# Patient Record
Sex: FEMALE | Race: BLACK OR AFRICAN AMERICAN | Employment: UNEMPLOYED | URBAN - METROPOLITAN AREA
[De-identification: names, ages, dates, MRNs, and addresses within clinical notes are randomized per-mention and may not be internally consistent; named-entity substitution may affect disease eponyms.]

---

## 2017-09-10 ENCOUNTER — HOSPITAL ENCOUNTER (EMERGENCY)
Age: 27
Discharge: HOME | End: 2017-09-10
Attending: EMERGENCY MEDICINE
Payer: MEDICAID

## 2017-09-10 VITALS
DIASTOLIC BLOOD PRESSURE: 73 MMHG | TEMPERATURE: 98.4 F | HEIGHT: 63 IN | WEIGHT: 217.59 LBS | OXYGEN SATURATION: 100 % | RESPIRATION RATE: 16 BRPM | BODY MASS INDEX: 38.55 KG/M2 | SYSTOLIC BLOOD PRESSURE: 128 MMHG | HEART RATE: 71 BPM

## 2017-09-10 PROCEDURE — 96374 THER/PROPH/DIAG INJ IV PUSH: CPT

## 2017-09-10 PROCEDURE — 96375 TX/PRO/DX INJ NEW DRUG ADDON: CPT

## 2017-09-10 PROCEDURE — 96361 HYDRATE IV INFUSION ADD-ON: CPT

## 2017-09-10 PROCEDURE — 74011250637 HC RX REV CODE- 250/637: Performed by: EMERGENCY MEDICINE

## 2017-09-10 PROCEDURE — 74011250636 HC RX REV CODE- 250/636: Performed by: EMERGENCY MEDICINE

## 2017-09-10 PROCEDURE — 99283 EMERGENCY DEPT VISIT LOW MDM: CPT

## 2017-09-10 RX ORDER — ACETAMINOPHEN 325 MG/1
650 TABLET ORAL
Status: COMPLETED | OUTPATIENT
Start: 2017-09-10 | End: 2017-09-10

## 2017-09-10 RX ORDER — ONDANSETRON 2 MG/ML
4 INJECTION INTRAMUSCULAR; INTRAVENOUS
Status: COMPLETED | OUTPATIENT
Start: 2017-09-10 | End: 2017-09-10

## 2017-09-10 RX ORDER — METOCLOPRAMIDE HYDROCHLORIDE 5 MG/ML
10 INJECTION INTRAMUSCULAR; INTRAVENOUS
Status: COMPLETED | OUTPATIENT
Start: 2017-09-10 | End: 2017-09-10

## 2017-09-10 RX ORDER — BUTALBITAL, ACETAMINOPHEN AND CAFFEINE 50; 325; 40 MG/1; MG/1; MG/1
2 TABLET ORAL
Status: COMPLETED | OUTPATIENT
Start: 2017-09-10 | End: 2017-09-10

## 2017-09-10 RX ORDER — BUTALBITAL, ACETAMINOPHEN AND CAFFEINE 300; 40; 50 MG/1; MG/1; MG/1
1 CAPSULE ORAL
Qty: 15 CAP | Refills: 0 | Status: SHIPPED | OUTPATIENT
Start: 2017-09-10 | End: 2017-10-25 | Stop reason: DRUGHIGH

## 2017-09-10 RX ORDER — OXYCODONE AND ACETAMINOPHEN 5; 325 MG/1; MG/1
1 TABLET ORAL
COMMUNITY
End: 2017-10-25 | Stop reason: ALTCHOICE

## 2017-09-10 RX ADMIN — SODIUM CHLORIDE 1000 ML: 900 INJECTION, SOLUTION INTRAVENOUS at 15:27

## 2017-09-10 RX ADMIN — BUTALBITAL, ACETAMINOPHEN AND CAFFEINE 2 TABLET: 50; 325; 40 TABLET ORAL at 16:13

## 2017-09-10 RX ADMIN — ONDANSETRON 4 MG: 2 INJECTION INTRAMUSCULAR; INTRAVENOUS at 15:23

## 2017-09-10 RX ADMIN — METOCLOPRAMIDE 10 MG: 5 INJECTION, SOLUTION INTRAMUSCULAR; INTRAVENOUS at 15:23

## 2017-09-10 RX ADMIN — ACETAMINOPHEN 650 MG: 325 TABLET ORAL at 15:23

## 2017-09-10 NOTE — ED NOTES
Pt presents to ED generalized headache, neck pain, nausea x 4th. Pt reports she had a baby vaginal 9/4/17, pt reports she had an epidural. Pt alert and oriented x 4. Pt reports tying percocet, ibuprofen x without relief.

## 2017-09-10 NOTE — ED NOTES
I have reviewed discharge instructions with the patient. The patient verbalized understanding. Pt discharged with written instructions. No further concerns at this time. IV removed.

## 2017-09-10 NOTE — PROGRESS NOTES
Called to see pt for epidural blood patch s/p multiple [place] (without success) [place] [facility] [place]. Pt describes post dural puncture type head ache after about three attempts from multiple providers at [facility] for labor 6 days ago. I explained this risk of performing an epidural blood patch and that I would have to attempt an epidural to perform an \"epidural blood patch\". She didn't know that I would have to do an epidural to perform an \"epidural blood patch\". I stated it might be difficult because anesthesia at [facility] [place] perform the epidural successfully, but that I would be happy to try and prior failures did not guarantee that I would be unsuccessful. I also told her most pt's experience some back pain/pressure after a blood patch and she said she already had back pain and she did not want to do anything that might make her back pain worse or possibly worsen her head ache as any epidural attempt carries that risk. She said she will just try to wait it out until her head ache subsides as her body should make more CSF. I told her she is welcome to return at any time if she changes her mind and certainly if her HA worsens or other symptom appear. I made it clear that I could attempt the epidural blood patch right now, but she declined.

## 2017-09-10 NOTE — ED PROVIDER NOTES
HPI Comments: Casis Muir is a 23 y.o. female with hx of vaginal birth 9/4/17 who presents ambulatory to the ED c/o constant severe diffuse HA, neck pain, photophobia, and nausea s/p delivery. She also notes having SOB and moderate abdominal pain. Pt reports three attempts of epidural injections were made without success. Pt was discharged from the hospital on 9/8/17. She denies any complications with the delivery. She denies taking any blood thinners. Pt notes taking BCs, percocet and ibuprofen without relief. Pt specifically denies fever, chills, sore throat, rhinorrhea, cough, CP, V/D, dysuria, lightheadedness and rash. Social hx: - Tobacco use, - EtOH use, - Illicit drug use    PCP: None    There are no other complaints, changes or physical findings at this time. The history is provided by the patient. No  was used. Past Medical History:   Diagnosis Date    Ill-defined condition     Vaginal Birth 9/4/17       History reviewed. No pertinent surgical history. History reviewed. No pertinent family history. Social History     Social History    Marital status: SINGLE     Spouse name: N/A    Number of children: N/A    Years of education: N/A     Occupational History    Not on file. Social History Main Topics    Smoking status: Never Smoker    Smokeless tobacco: Never Used    Alcohol use No    Drug use: No    Sexual activity: Not on file     Other Topics Concern    Not on file     Social History Narrative    No narrative on file         ALLERGIES: Review of patient's allergies indicates no known allergies. Review of Systems   Constitutional: Negative for chills, diaphoresis, fever and unexpected weight change. HENT: Negative for rhinorrhea and sore throat. Eyes: Positive for photophobia. Negative for pain. Respiratory: Positive for shortness of breath. Negative for wheezing and stridor. Cardiovascular: Negative for chest pain and leg swelling. Gastrointestinal: Positive for abdominal pain and nausea. Negative for blood in stool, diarrhea and vomiting. Genitourinary: Negative for difficulty urinating, dysuria and flank pain. Musculoskeletal: Positive for neck pain. Negative for back pain and neck stiffness. Skin: Negative for rash. Neurological: Positive for headaches. Negative for seizures, syncope, weakness and light-headedness. Psychiatric/Behavioral: Negative for confusion. Patient Vitals for the past 12 hrs:   Temp Pulse Resp BP SpO2   09/10/17 1730 - - - 128/73 100 %   09/10/17 1323 98.4 °F (36.9 °C) 71 16 135/78 100 %       Physical Exam   Constitutional: She is oriented to person, place, and time. She appears well-developed and well-nourished. No distress. Uncomfortable appearing. HENT:   Nose: Nose normal.   Mouth/Throat: Oropharynx is clear and moist. No oropharyngeal exudate. Eyes: Conjunctivae and EOM are normal. Pupils are equal, round, and reactive to light. Right eye exhibits no discharge. Left eye exhibits no discharge. No scleral icterus. Wearing sunglasses. Neck: Normal range of motion. Neck supple. No JVD present. Cardiovascular: Normal rate, regular rhythm, normal heart sounds and intact distal pulses. No murmur heard. Pulmonary/Chest: Effort normal and breath sounds normal. No stridor. No respiratory distress. She has no wheezes. She has no rales. Abdominal: Soft. Bowel sounds are normal. She exhibits no distension. There is no rebound and no guarding. Mild diffuse tenderness to abdomen (appropriate)   Musculoskeletal: She exhibits no edema or tenderness. Neurological: She is alert and oriented to person, place, and time. Skin: Skin is warm and dry. No rash noted. She is not diaphoretic. Psychiatric: She has a normal mood and affect. Nursing note and vitals reviewed.        MDM  Number of Diagnoses or Management Options  Epidural anesthesia-induced headache during puerperium:   Diagnosis management comments: Post epidural HA x six days. Pt neurologically intact. Awaiting blood patch at time of sign out. Amount and/or Complexity of Data Reviewed  Review and summarize past medical records: yes    Patient Progress  Patient progress: stable    ED Course       Procedures    CONSULT NOTE:   2:40 PM  Radha Keita MD spoke with Dr. Selma Jurado,  Specialty: anesthesiology  Discussed pt's hx, disposition, and available diagnostic and imaging results. Reviewed care plans. Consultant agrees with plans as outlined. Dr. Selma Jurado will perform a blood patch. Written by Blake May ED Scribe, as dictated by Radha Keita MD.      SIGN OUT:  4:54 PM  Patient's presentation, labs/imaging and plan of care was reviewed with Dr. Jung Valladares as part of sign out. They will dispo after blood patch as part of the plan discussed with the patient. Dr. Nathan Ram assistance in completion of this plan is greatly appreciated but it should be noted that I will be the provider of record for this patient. Radha Keita MD  Progress Note:  6:38 PM  Discussed workup results/findings, and counseled pt regarding her diagnosis. Pt has been encouraged to follow-up as instructed. All questions have been answered, and pt conveyed understanding. Written by NIKKIE Dyer, as dictated by Asha Blackmon DO.    MEDICATIONS GIVEN:  Medications   sodium chloride 0.9 % bolus infusion 1,000 mL (0 mL IntraVENous IV Completed 9/10/17 1825)   acetaminophen (TYLENOL) tablet 650 mg (650 mg Oral Given 9/10/17 1523)   ondansetron (ZOFRAN) injection 4 mg (4 mg IntraVENous Given 9/10/17 1523)   metoclopramide HCl (REGLAN) injection 10 mg (10 mg IntraVENous Given 9/10/17 1523)   butalbital-acetaminophen-caffeine (FIORICET, ESGIC) -40 mg per tablet 2 Tab (2 Tabs Oral Given 9/10/17 1613)       IMPRESSION:  1. Epidural anesthesia-induced headache during puerperium        PLAN:  1. Discharge home.   Current Discharge Medication List      START taking these medications    Details   butalbital-acetaminophen-caff (FIORICET) -40 mg per capsule Take 1 Cap by mouth every four (4) hours as needed for Pain. Max Daily Amount: 6 Caps. Qty: 15 Cap, Refills: 0           2. Follow-up Information     Follow up With Details Comments Contact Info    [facility] Department Of Obstetrics & Gynecology Call in 2 days  [address]  [phone]        3. Return to ED if worse       DISCHARGE NOTE:  6:38 PM  Patient's results have been reviewed with them. Patient and/or family have verbally conveyed their understanding and agreement of the patient's signs, symptoms, diagnosis, treatment and prognosis and additionally agree to follow up as recommended or return to the Emergency Room should their condition change prior to follow-up. Discharge instructions have also been provided to the patient with some educational information regarding their diagnosis as well a list of reasons why they would want to return to the ER prior to their follow-up appointment should their condition change. This note is prepared by Cristela Goyal, acting as Scribe for DO Lizbeth Mcneil DO: The scribe's documentation has been prepared under my direction and personally reviewed by me in its entirety. I confirm that the note above accurately reflects all work, treatment, procedures, and medical decision making performed by me.

## 2017-10-25 ENCOUNTER — OFFICE VISIT (OUTPATIENT)
Dept: INTERNAL MEDICINE CLINIC | Age: 27
End: 2017-10-25

## 2017-10-25 VITALS
RESPIRATION RATE: 18 BRPM | WEIGHT: 203 LBS | TEMPERATURE: 98.6 F | HEIGHT: 63 IN | DIASTOLIC BLOOD PRESSURE: 76 MMHG | OXYGEN SATURATION: 98 % | BODY MASS INDEX: 35.97 KG/M2 | HEART RATE: 87 BPM | SYSTOLIC BLOOD PRESSURE: 115 MMHG

## 2017-10-25 DIAGNOSIS — R29.898 WEAKNESS OF BOTH LEGS: ICD-10-CM

## 2017-10-25 DIAGNOSIS — Z76.89 ENCOUNTER TO ESTABLISH CARE: ICD-10-CM

## 2017-10-25 DIAGNOSIS — Z13.31 DEPRESSION SCREENING: ICD-10-CM

## 2017-10-25 DIAGNOSIS — M54.50 ACUTE MIDLINE LOW BACK PAIN WITHOUT SCIATICA: ICD-10-CM

## 2017-10-25 DIAGNOSIS — Z97.5 NORPLANT IN PLACE: ICD-10-CM

## 2017-10-25 DIAGNOSIS — R51.9 FREQUENT HEADACHES: Primary | ICD-10-CM

## 2017-10-25 RX ORDER — DOCUSATE SODIUM 100 MG
CAPSULE ORAL
Refills: 0 | COMMUNITY
Start: 2017-09-06 | End: 2017-10-25

## 2017-10-25 RX ORDER — LANOLIN ALCOHOL/MO/W.PET/CERES
CREAM (GRAM) TOPICAL
Refills: 0 | COMMUNITY
Start: 2017-09-06 | End: 2017-10-25

## 2017-10-25 RX ORDER — SENNOSIDES 8.6 MG
TABLET ORAL
Refills: 0 | COMMUNITY
Start: 2017-09-06 | End: 2017-10-25

## 2017-10-25 RX ORDER — IBUPROFEN 600 MG/1
TABLET ORAL
Refills: 0 | COMMUNITY
Start: 2017-09-06 | End: 2017-10-25 | Stop reason: ALTCHOICE

## 2017-10-25 RX ORDER — BUTALBITAL, ACETAMINOPHEN AND CAFFEINE 50; 325; 40 MG/1; MG/1; MG/1
TABLET ORAL
Refills: 0 | COMMUNITY
Start: 2017-09-29 | End: 2017-10-25 | Stop reason: DRUGHIGH

## 2017-10-25 RX ORDER — BUTALBITAL, ACETAMINOPHEN AND CAFFEINE 300; 40; 50 MG/1; MG/1; MG/1
CAPSULE ORAL
Qty: 12 CAP | Refills: 0 | Status: SHIPPED | OUTPATIENT
Start: 2017-10-25 | End: 2017-12-06 | Stop reason: SDUPTHER

## 2017-10-25 RX ORDER — AMITRIPTYLINE HYDROCHLORIDE 25 MG/1
TABLET, FILM COATED ORAL
Qty: 30 TAB | Refills: 11 | Status: SHIPPED | OUTPATIENT
Start: 2017-10-25 | End: 2018-08-14

## 2017-10-25 RX ORDER — BUTALBITAL, ACETAMINOPHEN AND CAFFEINE 300; 40; 50 MG/1; MG/1; MG/1
1 CAPSULE ORAL
Qty: 15 CAP | Refills: 0 | Status: CANCELLED | OUTPATIENT
Start: 2017-10-25

## 2017-10-25 RX ORDER — NAPROXEN 500 MG/1
500 TABLET ORAL
Qty: 20 TAB | Refills: 3 | Status: SHIPPED | OUTPATIENT
Start: 2017-10-25 | End: 2017-12-06 | Stop reason: SDUPTHER

## 2017-10-25 NOTE — PROGRESS NOTES
Pt is here for   Chief Complaint   Patient presents with    New Patient     pt is here to establish care   [address]     pt states she's still bleeding, had baby on 9/4/17, pt states that she does have the implanon    Headache     pt states she's been having really bad headaches     1. Have you been to the ER, urgent care clinic since your last visit? Hospitalized since your last visit? No    2. Have you seen or consulted any other health care providers outside of the [address] [place] [address] since your last visit? Include any pap smears or colon screening.  No     Pt denies pain at this time

## 2017-10-25 NOTE — PROGRESS NOTES
Esther Ireland is a 23 y.o. female and presents with New Patient (pt is here to establish care); Post-Partum Care (pt states she's still bleeding, had baby on 17, pt states that she does have the implanon); and Headache (pt states she's been having really bad headaches)    Subjective:  Pt here to establish care. A0, delivered 17 via  to baby girl. Sole provider at this time. Here for postpartum visit for insurance, however still with vaginal bleeding reported. Using several pads daily for blood. Associated with lower back pain and headaches following epidural attempt during delivery. Denies suicidal or homicidal ideation. No fever reported. Has HA several times daily, onset since delivery. No h/o lower back pain or headache during pregnancy or before, only since delivery and epidural attempt. Delivered at [facility] and followed by their OB clinic initially, but prefers a different provider so came here for care instead. Not lactating. Also reports gaining 75 lbs during pregnancy, down 30 lbs now. Usually very active.      Review of Systems  Constitutional: negative for fevers, chills, anorexia and weight loss  Eyes:   negative for visual disturbance, drainage, and irritation  ENT:   negative for tinnitus,sore throat,nasal congestion,ear pain,and hoarseness  Respiratory:  negative for cough, hemoptysis, dyspnea, and wheezing  CV:   negative for chest pain, palpitations, and lower extremity edema  GI:   negative for nausea, vomiting, diarrhea, abdominal pain, and melena  Endo:               negative for polyuria,polydipsia,polyphagia, and heat intolerance  Genitourinary: negative for frequency, urgency, dysuria, retention, and hematuria  Integument:  negative for rash, ulcerations, and pruritus  Hematologic:  negative for easy bruising and bleeding  Musculoskel: negative for arthralgias, muscle weakness,and joint pain/swelling  Neurological:  negative for headaches, dizziness, vertigo,and memory/gait problems  Behavl/Psych: negative for feelings of anxiety, depression, suicide, and mood changes    Past Medical History:   Diagnosis Date    Headache     Ill-defined condition     Vaginal Birth 9/4/17     History reviewed. No pertinent surgical history. Social History     Social History    Marital status: SINGLE     Spouse name: N/A    Number of children: N/A    Years of education: N/A     Social History Main Topics    Smoking status: Never Smoker    Smokeless tobacco: Never Used    Alcohol use No    Drug use: No    Sexual activity: Not Asked     Other Topics Concern    None     Social History Narrative     History reviewed. No pertinent family history. Current Outpatient Prescriptions   Medication Sig Dispense Refill    butalbital-acetaminophen-caff (FIORICET) -40 mg per capsule Take 1 tab every 3 days as needed for severe headache/migraine  Indications: HEADACHE 12 Cap 0    naproxen (NAPROSYN) 500 mg tablet Take 1 Tab by mouth two (2) times daily as needed for Pain for up to 10 days. With food 20 Tab 3    amitriptyline (ELAVIL) 25 mg tablet Start with 1/2 tab for a few days, if NOT too sedating, increase to 1 tab nightly. Indications: HA suppression, insomnia 30 Tab 11     No Known Allergies    Objective:  Visit Vitals    /76 (BP 1 Location: Left arm, BP Patient Position: Sitting)    Pulse 87    Temp 98.6 °F (37 °C) (Oral)    Resp 18    Ht 5' 3\" (1.6 m)    Wt 203 lb (92.1 kg)    SpO2 98%    BMI 35.96 kg/m2     Wt Readings from Last 3 Encounters:   10/25/17 203 lb (92.1 kg)   09/10/17 217 lb 9.5 oz (98.7 kg)     Physical Exam:   General appearance - alert, well appearing, and in no distress. Mental status - A/O x 4, sad mood and flat affect. Neck -Supple ,normal CSP. FROM, non-tender. No significant adenopathy/thyromegaly. No JVD. Chest - CTA. Symmetric chest rise. No wheezing, rales or rhonchi. Heart - Normal rate, regular rhythm. Normal S1, S2.  No MGR or clicks. Abdomen - Soft,non-distended. Normoactive BS in all quadrants. NT, no mass or HSM. Palpable fundus above pelvis. Ext- Radial, DP pulses, 2+ bilaterally. No pedal edema, clubbing, or cyanosis. Skin-Warm and dry. No hyperpigmentation, ulcerations, or suspicious lesions. Neuro - Normal speech, no focal findings or movement disorder. Normal strength, gait, and muscle tone. Back- alignment midline. No spinal or paraspinal tenderness. No CVAT. Assessment/Plan:  Labs ordered. Referred to NEURO and OBGYN. Amitriptyline 1/2-1 tab for frequent HA, naproxen, and fioricet prescribed. Pt planning to lose wt now via exercise and modified diet. MRI of LSP to r/o epidural abscess. I spent greater than 50% of 45 minute visit counseling patient about diagnostic results, impressions, prognosis, risk/benefits of treatment options, medication management and adequate follow-up, importance of adherence to treatment plan, and risk factor reduction. Discussed the patient's BMI with her. The BMI follow up plan is as follows:     I have reviewed/discussed the above normal BMI with the patient. I have recommended the following interventions: dietary management education, guidance, and counseling, encourage exercise and monitor weight . Medication Side Effects and Warnings were discussed with patient: yes   Patient Labs were reviewed: yes  Patient Past Records were reviewed: yes    See below for other orders   Follow-up Disposition:  Return in about 6 weeks (around 12/6/2017) for 6 wk HA, LBP f/u. Pt has given consent verbally while in office for [employer] messaging. ICD-10-CM ICD-9-CM    1. Frequent headaches R51 784.0 REFERRAL TO NEUROLOGY      butalbital-acetaminophen-caff (FIORICET) -40 mg per capsule      naproxen (NAPROSYN) 500 mg tablet      amitriptyline (ELAVIL) 25 mg tablet   2.  Acute midline low back pain without sciatica M54.5 724.2 SED RATE (ESR)      CBC WITH AUTOMATED DIFF      METABOLIC PANEL, COMPREHENSIVE      MRI LUMB SPINE W WO CONT   3. Weakness of both legs R29.898 729.89 REFERRAL TO NEUROLOGY      MRI LUMB SPINE W WO CONT      HEMOGLOBIN A1C WITH EAG      TSH 3RD GENERATION   4. Postpartum hemorrhage, unspecified type O72.1 666.10 REFERRAL TO GYNECOLOGY   5. Normal spontaneous vaginal delivery O80 650 REFERRAL TO GYNECOLOGY   6. Norplant in place Z97.5 V45.52    7. Encounter to establish care Z76.89 V65.8    8. Depression screening Z13.89 V79.0      Orders Placed This Encounter    MRI LUMB SPINE W WO CONT     Standing Status:   Future     Standing Expiration Date:   11/25/2018     Scheduling Instructions:      6 wk postpartum     Order Specific Question:   Is Patient Allergic to Contrast Dye? Answer:   No     Order Specific Question:   Is Patient Pregnant? Answer:   No     Order Specific Question:   STAT Creatinine as indicated     Answer: Yes    SED RATE (ESR)    CBC WITH AUTOMATED DIFF    METABOLIC PANEL, COMPREHENSIVE    HEMOGLOBIN A1C WITH EAG    TSH 3RD GENERATION    REFERRAL TO GYNECOLOGY     Referral Priority:   Routine     Referral Type:   Consultation     Referral Reason:   Specialty Services Required     Referred to Provider:   Rianna Owens NP    REFERRAL TO NEUROLOGY     Referral Priority:   Routine     Referral Type:   Consultation     Referral Reason:   Specialty Services Required     Referred to Provider:   Salud Bazan MD    DISCONTD: butalbital-acetaminophen-caffeine (FIORICET, ESGIC) -40 mg per tablet     Sig: TK 1 T PO  Q 4 H PRN FOR PAIN. MDD  6 TABLETS.      Refill:  0    DISCONTD: STOOL SOFTENER 100 mg capsule     Sig: TAKE ONE CAPSULE BY MOUTH TWICE A DAY AS NEEDED FOR CONSTIPATION     Refill:  0    DISCONTD: ferrous sulfate 325 mg (65 mg iron) tablet     Sig: TAKE ONE TABLET BY MOUTH TWICE A DAY     Refill:  0    DISCONTD: ibuprofen (MOTRIN) 600 mg tablet     Sig: TAKE ONE TABLET BY MOUTH THREE TIMES A DAY AS NEEDED FOR PAIN Refill:  0    DISCONTD: SENNA 8.6 mg tablet     Sig: TAKE ONE TABLET BY MOUTH AT BEDTIME AS NEEDED FOR CONSTIPATION     Refill:  0    butalbital-acetaminophen-caff (FIORICET) -40 mg per capsule     Sig: Take 1 tab every 3 days as needed for severe headache/migraine  Indications: HEADACHE     Dispense:  12 Cap     Refill:  0    naproxen (NAPROSYN) 500 mg tablet     Sig: Take 1 Tab by mouth two (2) times daily as needed for Pain for up to 10 days. With food     Dispense:  20 Tab     Refill:  3    amitriptyline (ELAVIL) 25 mg tablet     Sig: Start with 1/2 tab for a few days, if NOT too sedating, increase to 1 tab nightly. Indications: HA suppression, insomnia     Dispense:  30 Tab     Refill:  11       Amanda Figueroa expressed understanding of plan. An After Visit Summary was offered/printed and given to the patient.

## 2017-10-25 NOTE — PATIENT INSTRUCTIONS
Try taking PROBIOTICS 10 billion units daily for GI health. For vaginal health, 2-4 billion daily. Epidural for Childbirth: What to Expect at [facility]  An epidural is a tiny tube that delivers pain medicine directly into the area in your back around your spinal cord. The effects of the epidural usually wear off within 2 hours after the epidural medicine is stopped. After the epidural wears off, you may have some hip or back pain from childbirth. You may have a small bruise and the skin may be sore where the epidural was put in your back. This will probably get better in 1 or 2 days. In rare cases, an epidural can cause a headache, especially when you sit or stand. This can last for several days. If you get a headache after the epidural, call your doctor. He or she can treat it. This care sheet gives you a general idea about how long it will take for you to recover. But each person recovers at a different pace. Follow the steps below to get better as quickly as possible. How can you care for yourself at home? · By the time you go home, the effects of the epidural will be gone. If you have any problems, call your doctor. · Follow any instructions your doctor gave you about how to care for yourself after your delivery. Call your doctor if you have questions about post-delivery care. Follow-up care is a key part of your treatment and safety. Be sure to make and go to all appointments, and call your doctor if you are having problems. It's also a good idea to know your test results and keep a list of the medicines you take. When should you call for help? Call your doctor now or seek immediate medical care if:  · You have a fever. · You have a new or worse headache. · You have a stiff neck. · You have tingling, weakness, or numbness in your legs. · You have trouble urinating or can pass only very small amounts of urine.   Watch closely for changes in your health, and be sure to contact your doctor if:  · You do not get better as expected. Where can you learn more? Go to http://melvina-jase.info/. Enter [id number] [id number] in the search box to learn more about \"Epidural for Childbirth: What to Expect at Home. \"  Current as of: March 16, 2017  Content Version: 11.3  © 6581-9290 [employer]. Care instructions adapted under license by [employer] (which disclaims liability or warranty for this information). If you have questions about a medical condition or this instruction, always ask your healthcare professional. [address] any warranty or liability for your use of this information. MRI of the Lumbar Spine: About This Test  What is it? MRI (magnetic resonance imaging) is a test that uses a magnetic field and pulses of radio wave energy to make pictures of the organs and structures inside the body. An MRI can give your doctor information about the spine in your lower back (the lumbar spine). This can include the spine, the space around the spinal cord, and the vertebrae in your lower back. When you have an MRI, you lie on a table that moves into the MRI machine. Why is this test done? An MRI of the lumbar spine can help find the cause of symptoms like back pain or leg pain, weakness, or numbness. It can help find problems such as a herniated disc, a tumor, or an infection. How can you prepare for the test?  Talk to your doctor about all your health conditions before the test. For example, tell your doctor if:  · You are allergic to any medicines. · You are or might be pregnant. · You have a pacemaker, an artificial limb, any metal pins or metal parts in your body, metal heart valves, metal clips in your brain, metal implants in your ears, or any other implanted or prosthetic medical device. · You have an intrauterine device (IUD) in place. · You get nervous in confined spaces. You may need medicine to help you relax.   · You wear a patch that contains medicine. · You have kidney disease. What happens before the test?  · You will remove all metal objects, such as hearing aids, dentures, jewelry, watches, and hairpins. · You will take off all or most of your clothes and change into a gown. · You may have contrast material (dye) put into your arm through a tube called an IV. Contrast material helps doctors see specific organs, blood vessels, and most tumors. What happens during the test?  · You will lie on your back on a table that is part of the MRI scanner. Your head, chest, and arms may be held with straps to help you stay still. · The table will slide into the space that contains the magnet. · Inside the scanner you will hear a fan and feel air moving. You may hear tapping, thumping, or snapping noises. You may be given earplugs or headphones to reduce the noise. · You will be asked to hold still during the scan. You may be asked to hold your breath for short periods. · You may be alone in the scanning room, but a technologist will watch you through a window and talk with you during the test.  What else should you know about the test?  · An MRI does not hurt. · If a dye is used, you may feel a quick sting or pinch and some coolness when the IV is started. Some people feel sick to their stomach or get a headache. · If you breastfeed and are concerned about whether the dye used in this test is safe, talk to your doctor. Most experts believe that very little dye passes into breast milk and even less is passed on to the baby. But if you prefer, you can store some of your breast milk ahead of time and use it for a day or two after the test.  · You may feel warmth in the area being examined. This is normal.  How long does the test take? · The test usually takes 30 to 60 minutes.   What happens after the test?  · You will probably be able to go home right away, depending on the reason for the test.  · You can go back to your usual activities right away. Follow-up care is a key part of your treatment and safety. Be sure to make and go to all appointments, and call your doctor if you are having problems. It's also a good idea to keep a list of the medicines you take. Ask your doctor when you can expect to have your test results. Where can you learn more? Go to http://melvina-jase.info/. Enter Z348 in the search box to learn more about \"MRI of the Lumbar Spine: About This Test.\"  Current as of: October 14, 2016  Content Version: 11.3  © 9527-0722 [employer]. Care instructions adapted under license by [employer] (which disclaims liability or warranty for this information). If you have questions about a medical condition or this instruction, always ask your healthcare professional. [address] any warranty or liability for your use of this information. Butalbital/Acetaminophen/Caffeine (By mouth)   Acetaminophen (k-gzym-n-MIN-oh-fen), Butalbital (nsw-WHF-uu-noman), Caffeine (KAF-een)  Treats headaches. Brand Name(s): Capacet, Esgic, Fioricet, Vanatol LQ, Vanatol S, Zebutal   There may be other brand names for this medicine. When This Medicine Should Not Be Used: This medicine is not right for everyone. Do not use it if you had an allergic reaction to acetaminophen, butalbital, or caffeine, or if you have porphyria. How to Use This Medicine:   Capsule, Liquid, Tablet  · Take your medicine as directed. Do not use more medicine or take it more often than your doctor tells you to. · Measure the oral liquid medicine with a marked measuring spoon, oral syringe, or medicine cup. · This medicine is not for long-term use. · Store the medicine in a closed container at room temperature, away from heat, moisture, and direct light.   Drugs and Foods to Avoid:   Ask your doctor or pharmacist before using any other medicine, including over-the-counter medicines, vitamins, and herbal products. · Some medicines can affect how this medicine works. Tell your doctor if you are also using an MAO inhibitor (MAOI). · Tell your doctor if you use anything else that makes you sleepy. Some examples are allergy medicine, narcotic pain medicine, and alcohol. · Do not drink alcohol while you are using this medicine. Acetaminophen can damage your liver, and alcohol can increase this risk. Warnings While Using This Medicine:   · Tell your doctor if you are pregnant or breastfeeding, or if you have kidney disease, liver disease, or stomach problems. Tell your doctor if you have a history of alcohol or drug addiction. · This medicine may cause the following problems:  ¨ Liver damage  ¨ Serious skin reactions  · This medicine contains acetaminophen. Read the labels of all other medicines you are using to see if they also contain acetaminophen, or ask your doctor or pharmacist. April Simmons not use more than 4 grams (4,000 milligrams) total of acetaminophen in one day. · This medicine may make you dizzy or drowsy. Do not drive or do anything that could be dangerous until you know how this medicine affects you. · This medicine can be habit-forming. Do not use more than your prescribed dose. Call your doctor if you think your medicine is not working. · Tell any doctor or dentist who treats you that you are using this medicine. This medicine may affect certain medical test results. · Keep all medicine out of the reach of children. Never share your medicine with anyone.   Possible Side Effects While Using This Medicine:   Call your doctor right away if you notice any of these side effects:  · Allergic reaction: Itching or hives, swelling in your face or hands, swelling or tingling in your mouth or throat, chest tightness, trouble breathing  · Blistering, peeling, red skin rash  · Dark urine or pale stools, nausea, vomiting, loss of appetite, stomach pain, yellow skin or eyes  · Extreme dizziness or weakness, trouble breathing, slow heartbeat, seizures, and cold, clammy skin  · Lightheadedness, dizziness, fainting  If you notice these less serious side effects, talk with your doctor:   · Mild nausea or vomiting  · Sleepiness, tiredness  If you notice other side effects that you think are caused by this medicine, tell your doctor. Call your doctor for medical advice about side effects. You may report side effects to FDA at 9-330-FDA-3112  © 2017 [address] Information is for End User's use only and may not be sold, redistributed or otherwise used for commercial purposes. The above information is an  only. It is not intended as medical advice for individual conditions or treatments. Talk to your doctor, nurse or pharmacist before following any medical regimen to see if it is safe and effective for you. Amitriptyline (By mouth)   Amitriptyline (am-i-TRIP-ti-mark)  Treats depression. This medicine is a TCA. Brand Name(s): Elavil   There may be other brand names for this medicine. When This Medicine Should Not Be Used: This medicine is not right for everyone. Do not use if you had an allergic reaction to amitriptyline. How to Use This Medicine:   Tablet  · Take your medicine as directed. Your dose may need to be changed several times to find what works best for you. · This medicine should come with a Medication Guide. Ask your pharmacist for a copy if you do not have one. · Store the medicine in a closed container at room temperature, away from heat, moisture, and direct light. · Missed dose: Take a dose as soon as you remember. If it is almost time for your next dose, wait until then and take a regular dose. Do not take extra medicine to make up for a missed dose. Drugs and Foods to Avoid:   Ask your doctor or pharmacist before using any other medicine, including over-the-counter medicines, vitamins, and herbal products. · Do not use this medicine with cisapride.  Do not use this medicine and an MAO inhibitor (MAOI) within 14 days of each other. · Some medicines and foods can affect how amitriptyline works. Tell your doctor if you are using cimetidine, disulfiram, or guanethidine. Tell your doctor if you are using other medicine for depression (such as fluoxetine, paroxetine, sertraline), a phenothiazine medicine (such as promethazine, chlorpromazine), medicine for heart rhythm problems (such as flecainide, propafenone, quinidine), or thyroid medicine. · Tell your doctor if you use anything else that makes you sleepy. Some examples are allergy medicine, narcotic pain medicine, and alcohol. Warnings While Using This Medicine:   · Tell your doctor if you are pregnant or breastfeeding, or if you have liver disease, heart disease, diabetes, narrow-angle glaucoma, a seizure disorder, a thyroid problem, or trouble urinating. · For some children, teenagers, and young adults, this medicine may increase mental or emotional problems. This may lead to thoughts of suicide and violence. Talk with your doctor right away if you have any thoughts or behavior changes that concern you. Tell your doctor if you or anyone in your family has a history of bipolar disorder or suicide attempts. · This medicine may cause the following problems:   ¨ Heart rhythm problems  ¨ High or low blood sugar levels  · This medicine may make you dizzy or drowsy. Do not drive or do anything else that could be dangerous until you know how this medicine affects you. · Do not stop using this medicine suddenly. Your doctor will need to slowly decrease your dose before you stop it completely. · Keep all medicine out of the reach of children. Never share your medicine with anyone.   Possible Side Effects While Using This Medicine:   Call your doctor right away if you notice any of these side effects:  · Allergic reaction: Itching or hives, swelling in your face or hands, swelling or tingling in your mouth or throat, chest tightness, trouble breathing  · Anxiety, restlessness, seeing or hearing things that are not there  · Chest pain, trouble breathing  · Fast, pounding, or uneven heartbeat  · Feeling more excited or energetic than usual, racing thoughts, trouble sleeping  · Lightheadedness, dizziness, or fainting  · Seizures  · Thoughts of hurting yourself or others, unusual behavior  · Unusual bleeding or bruising  If you notice these less serious side effects, talk with your doctor:   · Blurred vision, dry mouth, fever  · Change in how much or how often you urinate  · Constipation, diarrhea, nausea, vomiting  · Drowsiness, sleepiness  · Sexual problems  If you notice other side effects that you think are caused by this medicine, tell your doctor. Call your doctor for medical advice about side effects. You may report side effects to FDA at 1-589-FDA-0701  © 2017 [address] Information is for End User's use only and may not be sold, redistributed or otherwise used for commercial purposes. The above information is an  only. It is not intended as medical advice for individual conditions or treatments. Talk to your doctor, nurse or pharmacist before following any medical regimen to see if it is safe and effective for you. You would benefit from more adequate rest. Please be sure to remove yourself from stimulating activities, like using your cellphone, tablet, or watching T.V. About 2 hours before bedtime. Instead engage in relaxing activities, like reading, yoga, or listening to calming music. Also try to refrain from using these devices in your sleep space or bed, as this trains your brain to stay awake in the bed versus sleeping. Avoid caffeine containing products like coffee, tea, chocolate, and soda within 2 hours of bedtime and try over the counter meds as needed to help like ZZZquil, benadryl, UNISOM or melatonin (they are non-habit forming).          Learning About Sleeping Well  What does sleeping well mean? Sleeping well means getting enough sleep. How much sleep is enough varies among people. The number of hours you sleep is not as important as how you feel when you wake up. If you do not feel refreshed, you probably need more sleep. Another sign of not getting enough sleep is feeling tired during the day. The average total nightly sleep time is 7½ to 8 hours. Healthy adults may need a little more or a little less than this. Why is getting enough sleep important? Getting enough quality sleep is a basic part of good health. When your sleep suffers, your mood and your thoughts can suffer too. You may find yourself feeling more grumpy or stressed. Not getting enough sleep also can lead to serious problems, including injury, accidents, anxiety, and depression. What might cause poor sleeping? Many things can cause sleep problems, including:  · Stress. Stress can be caused by fear about a single event, such as giving a speech. Or you may have ongoing stress, such as worry about work or school. · Depression, anxiety, and other mental or emotional conditions. · Changes in your sleep habits or surroundings. This includes changes that happen where you sleep, such as noise, light, or sleeping in a different bed. It also includes changes in your sleep pattern, such as having jet lag or working a late shift. · Health problems, such as pain, breathing problems, and restless legs syndrome. · Lack of regular exercise. How can you help yourself? Here are some tips that may help you sleep more soundly and wake up feeling more refreshed. Your sleeping area  · Use your bedroom only for sleeping and sex. A bit of light reading may help you fall asleep. But if it doesn't, do your reading elsewhere in the house. Don't watch TV in bed. · Be sure your bed is big enough to stretch out comfortably, especially if you have a sleep partner. · Keep your bedroom quiet, dark, and cool.  Use curtains, blinds, or a sleep mask to block out light. To block out noise, use earplugs, soothing music, or a \"white noise\" machine. Your evening and bedtime routine  · Create a relaxing bedtime routine. You might want to take a warm shower or bath, listen to soothing music, or drink a cup of noncaffeinated tea. · Go to bed at the same time every night. And get up at the same time every morning, even if you feel tired. What to avoid  · Limit caffeine (coffee, tea, caffeinated sodas) during the day, and don't have any for at least 4 to 6 hours before bedtime. · Don't drink alcohol before bedtime. Alcohol can cause you to wake up more often during the night. · Don't smoke or use tobacco, especially in the evening. Nicotine can keep you awake. · Don't take naps during the day, especially close to bedtime. · Don't lie in bed awake for too long. If you can't fall asleep, or if you wake up in the middle of the night and can't get back to sleep within 15 minutes or so, get out of bed and go to another room until you feel sleepy. · Don't take medicine right before bed that may keep you awake or make you feel hyper or energized. Your doctor can tell you if your medicine may do this and if you can take it earlier in the day. If you can't sleep  · Imagine yourself in a peaceful, pleasant scene. Focus on the details and feelings of being in a place that is relaxing. · Get up and do a quiet or boring activity until you feel sleepy. · Don't drink any liquids after 6 p.m. if you wake up often because you have to go to the bathroom. Where can you learn more? Go to http://melvina-jase.info/. Enter J510 in the search box to learn more about \"Learning About Sleeping Well. \"  Current as of: July 26, 2016  Content Version: 11.3  © 9467-3786 [employer]. Care instructions adapted under license by [employer] (which disclaims liability or warranty for this information).  If you have questions about a medical condition or this instruction, always ask your healthcare professional. [place] [address] any warranty or liability for your use of this information.

## 2017-10-25 NOTE — MR AVS SNAPSHOT
Visit Information Date & Time Provider Department Dept. Phone Encounter #  
 10/25/2017  8:20 AM Cristy Aleman NP [address] [facility] [phone] [id number] Follow-up Instructions Return in about 6 weeks (around 12/6/2017) for 6 wk HA, LBP f/u. Upcoming Health Maintenance Date Due  
 HPV AGE 9Y-34Y (1 of 3 - Female 3 Dose Series) 12/9/2002 DTaP/Tdap/Td series (1 - Tdap) 12/9/2012 PAP AKA CERVICAL CYTOLOGY 12/9/2012 INFLUENZA AGE 9 TO ADULT 8/1/2017 Allergies as of 10/25/2017  Review Complete On: 10/25/2017 By: Rosalva Brady. Bosher, LPN No Known Allergies Current Immunizations  Never Reviewed No immunizations on file. Not reviewed this visit You Were Diagnosed With   
  
 Codes Comments Frequent headaches    -  Primary ICD-10-CM: W33 ICD-9-CM: 784.0 Acute midline low back pain without sciatica     ICD-10-CM: M54.5 ICD-9-CM: 724.2 Weakness of both legs     ICD-10-CM: R29.898 ICD-9-CM: 729.89 Postpartum hemorrhage, unspecified type     ICD-10-CM: O72.1 ICD-9-CM: 666.10 Normal spontaneous vaginal delivery     ICD-10-CM: O80 
ICD-9-CM: 397 Norplant in place     ICD-10-CM: Z97.5 ICD-9-CM: V45.52 Vitals BP Pulse Temp Resp Height(growth percentile) Weight(growth percentile) 115/76 (BP 1 Location: Left arm, BP Patient Position: Sitting) 87 98.6 °F (37 °C) (Oral) 18 5' 3\" (1.6 m) 203 lb (92.1 kg) SpO2 BMI OB Status Smoking Status 98% 35.96 kg/m2 Postpartum Never Smoker Vitals History BMI and BSA Data Body Mass Index Body Surface Area 35.96 kg/m 2 2.02 m 2 Preferred Pharmacy Pharmacy Name Phone Laura [address]  [place] [phone] Your Updated Medication List  
  
   
This list is accurate as of: 10/25/17  9:09 AM.  Always use your most recent med list.  
  
  
  
  
 amitriptyline 25 mg tablet Commonly known as:  ELAVIL Start with 1/2 tab for a few days, if NOT too sedating, increase to 1 tab nightly. Indications: HA suppression, insomnia  
  
 butalbital-acetaminophen-caff -40 mg per capsule Commonly known as:  [employer] Take 1 tab every 3 days as needed for severe headache/migraine  Indications: HEADACHE  
  
 ferrous sulfate 325 mg (65 mg iron) tablet TAKE ONE TABLET BY MOUTH TWICE A DAY  
  
 naproxen 500 mg tablet Commonly known as:  NAPROSYN Take 1 Tab by mouth two (2) times daily as needed for Pain for up to 10 days. With food Senna 8.6 mg tablet Generic drug:  senna TAKE ONE TABLET BY MOUTH AT BEDTIME AS NEEDED FOR CONSTIPATION  
  
 STOOL SOFTENER 100 mg capsule Generic drug:  docusate sodium TAKE ONE CAPSULE BY MOUTH TWICE A DAY AS NEEDED FOR CONSTIPATION Prescriptions Printed Refills  
 butalbital-acetaminophen-caff (FIORICET) -40 mg per capsule 0 Sig: Take 1 tab every 3 days as needed for severe headache/migraine  Indications: HEADACHE Class: Print Prescriptions Sent to Pharmacy Refills  
 naproxen (NAPROSYN) 500 mg tablet 3 Sig: Take 1 Tab by mouth two (2) times daily as needed for Pain for up to 10 days. With food Class: Normal  
 Pharmacy: [facility] [address] Ph #: [phone] Route: Oral  
 amitriptyline (ELAVIL) 25 mg tablet 11 Sig: Start with 1/2 tab for a few days, if NOT too sedating, increase to 1 tab nightly. Indications: HA suppression, insomnia Class: Normal  
 Pharmacy: [facility] [address] Ph #: [phone] We Performed the Following CBC WITH AUTOMATED DIFF [85656 CPT(R)] HEMOGLOBIN A1C WITH EAG [78346 CPT(R)] METABOLIC PANEL, COMPREHENSIVE [70422 CPT(R)] REFERRAL TO GYNECOLOGY [REF30 Custom] Comments:  
 Please evaluate pt for Postpartum visit. Delivered 17, BB,  REFERRAL TO NEUROLOGY [LAM60 Custom] Comments:  
 Please evaluate patient for frequent headaches following attempted epidural.  
 SED RATE (ESR) [34826 CPT(R)] TSH 3RD GENERATION [47797 CPT(R)] Follow-up Instructions Return in about 6 weeks (around 2017) for 6 wk HA, LBP f/u. To-Do List   
 10/25/2017 Imaging:  MRI LUMB SPINE W WO CONT Referral Information Referral ID Referred By Referred To [id number] Davian Doughertyr, [address], NP   
   [address] [facility] [address], [address] Phone: [phone] Fax: [phone] Visits Status Start Date End Date 1 New Request 10/25/17 10/25/18 If your referral has a status of pending review or denied, additional information will be sent to support the outcome of this decision. Referral ID Referred By Referred To [id number] RODRIGO STONER MD  
   [address]  
   [address], [address] Phone: [phone] Fax: [phone] Visits Status Start Date End Date 1 New Request 10/25/17 10/25/18 If your referral has a status of pending review or denied, additional information will be sent to support the outcome of this decision. Referral ID Referred By Referred To [id number] Juan F Tidwell I Not Available Visits Status Start Date End Date 1 New Request 10/25/17 10/25/18 If your referral has a status of pending review or denied, additional information will be sent to support the outcome of this decision. Patient Instructions Try taking PROBIOTICS 10 billion units daily for GI health. For vaginal health, 2-4 billion daily. Epidural for Childbirth: What to Expect at [facility] Your Recovery An epidural is a tiny tube that delivers pain medicine directly into the area in your back around your spinal cord. The effects of the epidural usually wear off within 2 hours after the epidural medicine is stopped. After the epidural wears off, you may have some hip or back pain from childbirth. You may have a small bruise and the skin may be sore where the epidural was put in your back. This will probably get better in 1 or 2 days. In rare cases, an epidural can cause a headache, especially when you sit or stand. This can last for several days. If you get a headache after the epidural, call your doctor. He or she can treat it. This care sheet gives you a general idea about how long it will take for you to recover. But each person recovers at a different pace. Follow the steps below to get better as quickly as possible. How can you care for yourself at home? · By the time you go home, the effects of the epidural will be gone. If you have any problems, call your doctor. · Follow any instructions your doctor gave you about how to care for yourself after your delivery. Call your doctor if you have questions about post-delivery care. Follow-up care is a key part of your treatment and safety. Be sure to make and go to all appointments, and call your doctor if you are having problems. It's also a good idea to know your test results and keep a list of the medicines you take. When should you call for help? Call your doctor now or seek immediate medical care if: 
· You have a fever. · You have a new or worse headache. · You have a stiff neck. · You have tingling, weakness, or numbness in your legs. · You have trouble urinating or can pass only very small amounts of urine. Watch closely for changes in your health, and be sure to contact your doctor if: 
· You do not get better as expected. Where can you learn more? Go to http://melvina-jase.info/. Enter [id number] in the search box to learn more about \"Epidural for Childbirth: What to Expect at Home. \" 
 Current as of: March 16, 2017 Content Version: 11.3 © 5071-4594 [employer]. Care instructions adapted under license by [employer] (which disclaims liability or warranty for this information). If you have questions about a medical condition or this instruction, always ask your healthcare professional. [address] any warranty or liability for your use of this information. MRI of the Lumbar Spine: About This Test 
What is it? MRI (magnetic resonance imaging) is a test that uses a magnetic field and pulses of radio wave energy to make pictures of the organs and structures inside the body. An MRI can give your doctor information about the spine in your lower back (the lumbar spine). This can include the spine, the space around the spinal cord, and the vertebrae in your lower back. When you have an MRI, you lie on a table that moves into the MRI machine. Why is this test done? An MRI of the lumbar spine can help find the cause of symptoms like back pain or leg pain, weakness, or numbness. It can help find problems such as a herniated disc, a tumor, or an infection. How can you prepare for the test? 
Talk to your doctor about all your health conditions before the test. For example, tell your doctor if: 
· You are allergic to any medicines. · You are or might be pregnant. · You have a pacemaker, an artificial limb, any metal pins or metal parts in your body, metal heart valves, metal clips in your brain, metal implants in your ears, or any other implanted or prosthetic medical device. · You have an intrauterine device (IUD) in place. · You get nervous in confined spaces. You may need medicine to help you relax. · You wear a patch that contains medicine. · You have kidney disease. What happens before the test? 
· You will remove all metal objects, such as hearing aids, dentures, jewelry, watches, and hairpins. · You will take off all or most of your clothes and change into a gown. · You may have contrast material (dye) put into your arm through a tube called an IV. Contrast material helps doctors see specific organs, blood vessels, and most tumors. What happens during the test? 
· You will lie on your back on a table that is part of the MRI scanner. Your head, chest, and arms may be held with straps to help you stay still. · The table will slide into the space that contains the magnet. · Inside the scanner you will hear a fan and feel air moving. You may hear tapping, thumping, or snapping noises. You may be given earplugs or headphones to reduce the noise. · You will be asked to hold still during the scan. You may be asked to hold your breath for short periods. · You may be alone in the scanning room, but a technologist will watch you through a window and talk with you during the test. 
What else should you know about the test? 
· An MRI does not hurt. · If a dye is used, you may feel a quick sting or pinch and some coolness when the IV is started. Some people feel sick to their stomach or get a headache. · If you breastfeed and are concerned about whether the dye used in this test is safe, talk to your doctor. Most experts believe that very little dye passes into breast milk and even less is passed on to the baby. But if you prefer, you can store some of your breast milk ahead of time and use it for a day or two after the test. 
· You may feel warmth in the area being examined. This is normal. 
How long does the test take? · The test usually takes 30 to 60 minutes. What happens after the test? 
· You will probably be able to go home right away, depending on the reason for the test. 
· You can go back to your usual activities right away. Follow-up care is a key part of your treatment and safety.  Be sure to make and go to all appointments, and call your doctor if you are having problems. It's also a good idea to keep a list of the medicines you take. Ask your doctor when you can expect to have your test results. Where can you learn more? Go to http://melvina-jase.info/. Enter G181 in the search box to learn more about \"MRI of the Lumbar Spine: About This Test.\" Current as of: October 14, 2016 Content Version: 11.3 © 2564-3414 [employer]. Care instructions adapted under license by [employer] (which disclaims liability or warranty for this information). If you have questions about a medical condition or this instruction, always ask your healthcare professional. [address] any warranty or liability for your use of this information. Butalbital/Acetaminophen/Caffeine (By mouth) Acetaminophen (h-incs-z-MIN-oh-fen), Butalbital (gzq-XPH-ei-noman), Caffeine (KAF-een) Treats headaches. Brand Name(s): Capacet, Esgic, Fioricet, Vanatol LQ, Vanatol S, Zebutal  
There may be other brand names for this medicine. When This Medicine Should Not Be Used: This medicine is not right for everyone. Do not use it if you had an allergic reaction to acetaminophen, butalbital, or caffeine, or if you have porphyria. How to Use This Medicine:  
Capsule, Liquid, Tablet · Take your medicine as directed. Do not use more medicine or take it more often than your doctor tells you to. · Measure the oral liquid medicine with a marked measuring spoon, oral syringe, or medicine cup. · This medicine is not for long-term use. · Store the medicine in a closed container at room temperature, away from heat, moisture, and direct light. Drugs and Foods to Avoid: Ask your doctor or pharmacist before using any other medicine, including over-the-counter medicines, vitamins, and herbal products. · Some medicines can affect how this medicine works. Tell your doctor if you are also using an MAO inhibitor (MAOI). · Tell your doctor if you use anything else that makes you sleepy. Some examples are allergy medicine, narcotic pain medicine, and alcohol. · Do not drink alcohol while you are using this medicine. Acetaminophen can damage your liver, and alcohol can increase this risk. Warnings While Using This Medicine: · Tell your doctor if you are pregnant or breastfeeding, or if you have kidney disease, liver disease, or stomach problems. Tell your doctor if you have a history of alcohol or drug addiction. · This medicine may cause the following problems: 
¨ Liver damage ¨ Serious skin reactions · This medicine contains acetaminophen. Read the labels of all other medicines you are using to see if they also contain acetaminophen, or ask your doctor or pharmacist. Azar Pérez not use more than 4 grams (4,000 milligrams) total of acetaminophen in one day. · This medicine may make you dizzy or drowsy. Do not drive or do anything that could be dangerous until you know how this medicine affects you. · This medicine can be habit-forming. Do not use more than your prescribed dose. Call your doctor if you think your medicine is not working. · Tell any doctor or dentist who treats you that you are using this medicine. This medicine may affect certain medical test results. · Keep all medicine out of the reach of children. Never share your medicine with anyone. Possible Side Effects While Using This Medicine:  
Call your doctor right away if you notice any of these side effects: · Allergic reaction: Itching or hives, swelling in your face or hands, swelling or tingling in your mouth or throat, chest tightness, trouble breathing · Blistering, peeling, red skin rash · Dark urine or pale stools, nausea, vomiting, loss of appetite, stomach pain, yellow skin or eyes · Extreme dizziness or weakness, trouble breathing, slow heartbeat, seizures, and cold, clammy skin · Lightheadedness, dizziness, fainting If you notice these less serious side effects, talk with your doctor: · Mild nausea or vomiting · Sleepiness, tiredness If you notice other side effects that you think are caused by this medicine, tell your doctor. Call your doctor for medical advice about side effects. You may report side effects to FDA at 6-729-FDA-9118 © 2017 [address] Information is for End User's use only and may not be sold, redistributed or otherwise used for commercial purposes. The above information is an  only. It is not intended as medical advice for individual conditions or treatments. Talk to your doctor, nurse or pharmacist before following any medical regimen to see if it is safe and effective for you. Amitriptyline (By mouth) Amitriptyline (am-i-TRIP-ti-mark) Treats depression. This medicine is a TCA. Brand Name(s): Elavil There may be other brand names for this medicine. When This Medicine Should Not Be Used: This medicine is not right for everyone. Do not use if you had an allergic reaction to amitriptyline. How to Use This Medicine:  
Tablet · Take your medicine as directed. Your dose may need to be changed several times to find what works best for you. · This medicine should come with a Medication Guide. Ask your pharmacist for a copy if you do not have one. · Store the medicine in a closed container at room temperature, away from heat, moisture, and direct light. · Missed dose: Take a dose as soon as you remember. If it is almost time for your next dose, wait until then and take a regular dose. Do not take extra medicine to make up for a missed dose. Drugs and Foods to Avoid: Ask your doctor or pharmacist before using any other medicine, including over-the-counter medicines, vitamins, and herbal products. · Do not use this medicine with cisapride. Do not use this medicine and an MAO inhibitor (MAOI) within 14 days of each other. · Some medicines and foods can affect how amitriptyline works. Tell your doctor if you are using cimetidine, disulfiram, or guanethidine. Tell your doctor if you are using other medicine for depression (such as fluoxetine, paroxetine, sertraline), a phenothiazine medicine (such as promethazine, chlorpromazine), medicine for heart rhythm problems (such as flecainide, propafenone, quinidine), or thyroid medicine. · Tell your doctor if you use anything else that makes you sleepy. Some examples are allergy medicine, narcotic pain medicine, and alcohol. Warnings While Using This Medicine: · Tell your doctor if you are pregnant or breastfeeding, or if you have liver disease, heart disease, diabetes, narrow-angle glaucoma, a seizure disorder, a thyroid problem, or trouble urinating. · For some children, teenagers, and young adults, this medicine may increase mental or emotional problems. This may lead to thoughts of suicide and violence. Talk with your doctor right away if you have any thoughts or behavior changes that concern you. Tell your doctor if you or anyone in your family has a history of bipolar disorder or suicide attempts. · This medicine may cause the following problems:  
¨ Heart rhythm problems ¨ High or low blood sugar levels · This medicine may make you dizzy or drowsy. Do not drive or do anything else that could be dangerous until you know how this medicine affects you. · Do not stop using this medicine suddenly. Your doctor will need to slowly decrease your dose before you stop it completely. · Keep all medicine out of the reach of children. Never share your medicine with anyone. Possible Side Effects While Using This Medicine:  
Call your doctor right away if you notice any of these side effects: · Allergic reaction: Itching or hives, swelling in your face or hands, swelling or tingling in your mouth or throat, chest tightness, trouble breathing · Anxiety, restlessness, seeing or hearing things that are not there · Chest pain, trouble breathing · Fast, pounding, or uneven heartbeat · Feeling more excited or energetic than usual, racing thoughts, trouble sleeping · Lightheadedness, dizziness, or fainting · Seizures · Thoughts of hurting yourself or others, unusual behavior · Unusual bleeding or bruising If you notice these less serious side effects, talk with your doctor: · Blurred vision, dry mouth, fever · Change in how much or how often you urinate · Constipation, diarrhea, nausea, vomiting · Drowsiness, sleepiness · Sexual problems If you notice other side effects that you think are caused by this medicine, tell your doctor. Call your doctor for medical advice about side effects. You may report side effects to FDA at 4-078-FDA-7622 © 2017 [address]  Information is for End User's use only and may not be sold, redistributed or otherwise used for commercial purposes. The above information is an  only. It is not intended as medical advice for individual conditions or treatments. Talk to your doctor, nurse or pharmacist before following any medical regimen to see if it is safe and effective for you. You would benefit from more adequate rest. Please be sure to remove yourself from stimulating activities, like using your cellphone, tablet, or watching T.V. About 2 hours before bedtime. Instead engage in relaxing activities, like reading, yoga, or listening to calming music. Also try to refrain from using these devices in your sleep space or bed, as this trains your brain to stay awake in the bed versus sleeping. Avoid caffeine containing products like coffee, tea, chocolate, and soda within 2 hours of bedtime and try over the counter meds as needed to help like ZZZquil, benadryl, UNISOM or melatonin (they are non-habit forming). Learning About Sleeping Well What does sleeping well mean? Sleeping well means getting enough sleep. How much sleep is enough varies among people. The number of hours you sleep is not as important as how you feel when you wake up. If you do not feel refreshed, you probably need more sleep. Another sign of not getting enough sleep is feeling tired during the day. The average total nightly sleep time is 7½ to 8 hours. Healthy adults may need a little more or a little less than this. Why is getting enough sleep important? Getting enough quality sleep is a basic part of good health. When your sleep suffers, your mood and your thoughts can suffer too. You may find yourself feeling more grumpy or stressed. Not getting enough sleep also can lead to serious problems, including injury, accidents, anxiety, and depression. What might cause poor sleeping? Many things can cause sleep problems, including: · Stress. Stress can be caused by fear about a single event, such as giving a speech. Or you may have ongoing stress, such as worry about work or school. · Depression, anxiety, and other mental or emotional conditions. · Changes in your sleep habits or surroundings. This includes changes that happen where you sleep, such as noise, light, or sleeping in a different bed. It also includes changes in your sleep pattern, such as having jet lag or working a late shift. · Health problems, such as pain, breathing problems, and restless legs syndrome. · Lack of regular exercise. How can you help yourself? Here are some tips that may help you sleep more soundly and wake up feeling more refreshed. Your sleeping area · Use your bedroom only for sleeping and sex. A bit of light reading may help you fall asleep. But if it doesn't, do your reading elsewhere in the house. Don't watch TV in bed. · Be sure your bed is big enough to stretch out comfortably, especially if you have a sleep partner. · Keep your bedroom quiet, dark, and cool.  Use curtains, blinds, or a sleep mask to block out light. To block out noise, use earplugs, soothing music, or a \"white noise\" machine. Your evening and bedtime routine · Create a relaxing bedtime routine. You might want to take a warm shower or bath, listen to soothing music, or drink a cup of noncaffeinated tea. · Go to bed at the same time every night. And get up at the same time every morning, even if you feel tired. What to avoid · Limit caffeine (coffee, tea, caffeinated sodas) during the day, and don't have any for at least 4 to 6 hours before bedtime. · Don't drink alcohol before bedtime. Alcohol can cause you to wake up more often during the night. · Don't smoke or use tobacco, especially in the evening. Nicotine can keep you awake. · Don't take naps during the day, especially close to bedtime. · Don't lie in bed awake for too long. If you can't fall asleep, or if you wake up in the middle of the night and can't get back to sleep within 15 minutes or so, get out of bed and go to another room until you feel sleepy. · Don't take medicine right before bed that may keep you awake or make you feel hyper or energized. Your doctor can tell you if your medicine may do this and if you can take it earlier in the day. If you can't sleep · Imagine yourself in a peaceful, pleasant scene. Focus on the details and feelings of being in a place that is relaxing. · Get up and do a quiet or boring activity until you feel sleepy. · Don't drink any liquids after 6 p.m. if you wake up often because you have to go to the bathroom. Where can you learn more? Go to http://melvina-jase.info/. Enter V809 in the search box to learn more about \"Learning About Sleeping Well. \" Current as of: July 26, 2016 Content Version: 11.3 © 7973-2942 [employer].  Care instructions adapted under license by [employer] (which disclaims liability or warranty for this information). If you have questions about a medical condition or this instruction, always ask your healthcare professional. [address] any warranty or liability for your use of this information. Introducing [facility]! [employer] introduces [employer] patient portal. Now you can access parts of your medical record, email your doctor's office, and request medication refills online. 1. In your internet browser, go to [email or web address] 2. Click on the First Time User? Click Here link in the Sign In box. You will see the New Member Sign Up page. 3. Enter your [employer] Access Code exactly as it appears below. You will not need to use this code after youve completed the sign-up process. If you do not sign up before the expiration date, you must request a new code. · [employer] Access Code: [email or web address] Expires: 12/9/2017  6:24 PM 
 
4. Enter the last four digits of your Social Security Number (xxxx) and Date of Birth (mm/dd/yyyy) as indicated and click Submit. You will be taken to the next sign-up page. 5. Create a [employer] ID. This will be your [employer] login ID and cannot be changed, so think of one that is secure and easy to remember. 6. Create a [employer] password. You can change your password at any time. 7. Enter your Password Reset Question and Answer. This can be used at a later time if you forget your password. 8. Enter your e-mail address. You will receive e-mail notification when new information is available in [address] 9. Click Sign Up. You can now view and download portions of your medical record. 10. Click the Download Summary menu link to download a portable copy of your medical information. If you have questions, please visit the Frequently Asked Questions section of the [employer] website. Remember, [employer] is NOT to be used for urgent needs. For medical emergencies, dial 911. Now available from your iPhone and Android! Please provide this summary of care documentation to your next provider. Your primary care clinician is listed as RODRIGO Smith. If you have any questions after today's visit, please call [phone].

## 2017-10-26 LAB
ALBUMIN SERPL-MCNC: 4.6 G/DL (ref 3.5–5.5)
ALBUMIN/GLOB SERPL: 1.6 {RATIO} (ref 1.2–2.2)
ALP SERPL-CCNC: 133 IU/L (ref 39–117)
ALT SERPL-CCNC: 34 IU/L (ref 0–32)
AST SERPL-CCNC: 27 IU/L (ref 0–40)
BASOPHILS # BLD AUTO: 0 X10E3/UL (ref 0–0.2)
BASOPHILS NFR BLD AUTO: 0 %
BILIRUB SERPL-MCNC: 0.5 MG/DL (ref 0–1.2)
BUN SERPL-MCNC: 13 MG/DL (ref 6–20)
BUN/CREAT SERPL: 15 (ref 9–23)
CALCIUM SERPL-MCNC: 9.5 MG/DL (ref 8.7–10.2)
CHLORIDE SERPL-SCNC: 100 MMOL/L (ref 96–106)
CO2 SERPL-SCNC: 24 MMOL/L (ref 18–29)
CREAT SERPL-MCNC: 0.84 MG/DL (ref 0.57–1)
EOSINOPHIL # BLD AUTO: 0.2 X10E3/UL (ref 0–0.4)
EOSINOPHIL NFR BLD AUTO: 4 %
ERYTHROCYTE [DISTWIDTH] IN BLOOD BY AUTOMATED COUNT: 14.4 % (ref 12.3–15.4)
ERYTHROCYTE [SEDIMENTATION RATE] IN BLOOD BY WESTERGREN METHOD: 44 MM/HR (ref 0–32)
EST. AVERAGE GLUCOSE BLD GHB EST-MCNC: 103 MG/DL
GFR SERPLBLD CREATININE-BSD FMLA CKD-EPI: 112 ML/MIN/1.73
GFR SERPLBLD CREATININE-BSD FMLA CKD-EPI: 97 ML/MIN/1.73
GLOBULIN SER CALC-MCNC: 2.9 G/DL (ref 1.5–4.5)
GLUCOSE SERPL-MCNC: 88 MG/DL (ref 65–99)
HBA1C MFR BLD: 5.2 % (ref 4.8–5.6)
HCT VFR BLD AUTO: 37.4 % (ref 34–46.6)
HGB BLD-MCNC: 12 G/DL (ref 11.1–15.9)
IMM GRANULOCYTES # BLD: 0 X10E3/UL (ref 0–0.1)
IMM GRANULOCYTES NFR BLD: 0 %
LYMPHOCYTES # BLD AUTO: 1.8 X10E3/UL (ref 0.7–3.1)
LYMPHOCYTES NFR BLD AUTO: 33 %
MCH RBC QN AUTO: 26.1 PG (ref 26.6–33)
MCHC RBC AUTO-ENTMCNC: 32.1 G/DL (ref 31.5–35.7)
MCV RBC AUTO: 82 FL (ref 79–97)
MONOCYTES # BLD AUTO: 0.4 X10E3/UL (ref 0.1–0.9)
MONOCYTES NFR BLD AUTO: 8 %
NEUTROPHILS # BLD AUTO: 2.9 X10E3/UL (ref 1.4–7)
NEUTROPHILS NFR BLD AUTO: 55 %
PLATELET # BLD AUTO: 410 X10E3/UL (ref 150–379)
POTASSIUM SERPL-SCNC: 4.9 MMOL/L (ref 3.5–5.2)
PROT SERPL-MCNC: 7.5 G/DL (ref 6–8.5)
RBC # BLD AUTO: 4.59 X10E6/UL (ref 3.77–5.28)
SODIUM SERPL-SCNC: 140 MMOL/L (ref 134–144)
TSH SERPL DL<=0.005 MIU/L-ACNC: 1.28 UIU/ML (ref 0.45–4.5)
WBC # BLD AUTO: 5.4 X10E3/UL (ref 3.4–10.8)

## 2017-12-06 ENCOUNTER — OFFICE VISIT (OUTPATIENT)
Dept: INTERNAL MEDICINE CLINIC | Age: 27
End: 2017-12-06

## 2017-12-06 VITALS
HEIGHT: 63 IN | SYSTOLIC BLOOD PRESSURE: 122 MMHG | RESPIRATION RATE: 18 BRPM | OXYGEN SATURATION: 98 % | DIASTOLIC BLOOD PRESSURE: 75 MMHG | TEMPERATURE: 98.6 F | BODY MASS INDEX: 37.39 KG/M2 | HEART RATE: 85 BPM | WEIGHT: 211 LBS

## 2017-12-06 DIAGNOSIS — N93.9 VAGINAL BLEEDING: ICD-10-CM

## 2017-12-06 DIAGNOSIS — M54.50 CHRONIC MIDLINE LOW BACK PAIN WITHOUT SCIATICA: ICD-10-CM

## 2017-12-06 DIAGNOSIS — G89.29 CHRONIC MIDLINE LOW BACK PAIN WITHOUT SCIATICA: ICD-10-CM

## 2017-12-06 DIAGNOSIS — R51.9 FREQUENT HEADACHES: Primary | ICD-10-CM

## 2017-12-06 DIAGNOSIS — Z97.5 NORPLANT IN PLACE: ICD-10-CM

## 2017-12-06 RX ORDER — OXYCODONE AND ACETAMINOPHEN 5; 325 MG/1; MG/1
TABLET ORAL
Refills: 0 | COMMUNITY
Start: 2017-09-06 | End: 2017-12-06

## 2017-12-06 RX ORDER — BUTALBITAL, ACETAMINOPHEN AND CAFFEINE 50; 325; 40 MG/1; MG/1; MG/1
TABLET ORAL
Refills: 0 | COMMUNITY
Start: 2017-10-25 | End: 2017-12-06

## 2017-12-06 RX ORDER — BUTALBITAL, ACETAMINOPHEN AND CAFFEINE 300; 40; 50 MG/1; MG/1; MG/1
CAPSULE ORAL
Qty: 12 CAP | Refills: 0 | Status: SHIPPED | OUTPATIENT
Start: 2017-12-06 | End: 2018-08-14 | Stop reason: SDUPTHER

## 2017-12-06 RX ORDER — NAPROXEN 500 MG/1
500 TABLET ORAL
Qty: 20 TAB | Refills: 3
Start: 2017-12-06 | End: 2017-12-16

## 2017-12-06 NOTE — PROGRESS NOTES
Viki Garner is a 23 y.o. female and presents with Follow-up (6 week)    Subjective:  Pt here to f/u headaches, LBP, and vaginal bleeding. Unable to get MRI due to transportation, has tried to reschedule, but having technological difficulties. Open to having appt scheduled for her. Has taken elavil 1/2 tab nightly with some sedation, but some relief of daily headaches. Reports having 20 HAs since last visit versus daily. Last seen 10/25. LBP is the same, no change. Has NOT scheduled appt with NEURO yet. However seen by GYN for vaginal bleeding and prescribed med. Bleeding initially subsided, but followed by normal cycle 11/27 x 4 days and has had spotting for last few days. Using nexplanon for birth control currently. Review of Systems  Constitutional: negative for fevers, chills, anorexia and weight loss  Eyes:   negative for visual disturbance, drainage, and irritation  ENT:   negative for tinnitus,sore throat,nasal congestion,ear pain,and hoarseness  Respiratory:  negative for cough, hemoptysis, dyspnea, and wheezing  CV:   negative for chest pain, palpitations, and lower extremity edema  GI:   negative for nausea, vomiting, diarrhea, abdominal pain, and melena  Endo:               negative for polyuria,polydipsia,polyphagia, and heat intolerance  Genitourinary: negative for frequency, urgency, dysuria, retention, and hematuria  Integument:  negative for rash, ulcerations, and pruritus  Hematologic:  negative for easy bruising and bleeding  Musculoskel: negative for arthralgias, muscle weakness,and joint pain/swelling  Neurological:  negative for headaches, dizziness, vertigo,and memory/gait problems  Behavl/Psych: negative for feelings of anxiety, depression, suicide, and mood changes    Past Medical History:   Diagnosis Date    Headache     Ill-defined condition     Vaginal Birth [birth year]     History reviewed. No pertinent surgical history.   Social History     Social History    Marital status: SINGLE Spouse name: N/A    Number of children: N/A    Years of education: N/A     Social History Main Topics    Smoking status: Never Smoker    Smokeless tobacco: Never Used    Alcohol use No    Drug use: No    Sexual activity: Not Asked     Other Topics Concern    None     Social History Narrative     History reviewed. No pertinent family history. Current Outpatient Prescriptions   Medication Sig Dispense Refill    butalbital-acetaminophen-caff (FIORICET) -40 mg per capsule Take 1 tab every 3 days as needed for severe headache/migraine  Indications: HEADACHE 12 Cap 0    naproxen (NAPROSYN) 500 mg tablet Take 1 Tab by mouth two (2) times daily as needed for Pain for up to 10 days. With food 20 Tab 3    amitriptyline (ELAVIL) 25 mg tablet Start with 1/2 tab for a few days, if NOT too sedating, increase to 1 tab nightly. Indications: HA suppression, insomnia 30 Tab 11     No Known Allergies    Objective:  Visit Vitals    /75 (BP 1 Location: Right arm, BP Patient Position: Sitting)    Pulse 85    Temp 98.6 °F (37 °C) (Oral)    Resp 18    Ht 5' 3\" (1.6 m)    Wt 211 lb (95.7 kg)    SpO2 98%    BMI 37.38 kg/m2     Wt Readings from Last 3 Encounters:   12/06/17 211 lb (95.7 kg)   10/25/17 203 lb (92.1 kg)   09/10/17 217 lb 9.5 oz (98.7 kg)     Physical Exam:   General appearance - alert, well appearing, and in no distress. Mental status - A/O x 4, normal mood and flat affect. Neck -Supple ,normal CSP. FROM, non-tender. No JVD. Chest - CTA. Symmetric chest rise. No wheezing, rales or rhonchi. Heart - Normal rate, regular rhythm. Normal S1, S2. No MGR or clicks. Abdomen - Soft,non-distended. Normoactive BS in all quadrants. NT, no mass or HSM. Ext- Radial, DP pulses, 2+ bilaterally. No pedal edema, clubbing, or cyanosis. Skin-Warm and dry. No hyperpigmentation, ulcerations, or suspicious lesions. Neuro - Normal speech, no focal findings or movement disorder.  Normal strength, gait, and muscle tone. Back- alignment midline. No spinal or paraspinal tenderness. No CVAT. Assessment/Plan:  The current medical regimen is effective;  continue present plan and medications. Use of NSAIDs BEFORE Fioricet reviewed. Encouraged to make appt to see NEURO, ideally following MRI. Miguel Roman will help schedule MRI for pt due to phone issue reported. Deferring vaginal bldg to GYN and further HA mgt to NEURO. Discussed the patient's BMI with her. The BMI follow up plan is as follows: Low-carb and ketogenic food plan reviewed. I have reviewed/discussed the above normal BMI (Body mass index is 37.38 kg/(m^2). ) with the patient. I have recommended the following interventions: encourage exercise, monitor weight, and dietary management education, guidance, and counseling, . Medication Side Effects and Warnings were discussed with patient: yes   Patient Labs were reviewed: yes  Patient Past Records were reviewed: yes    See below for other orders   Follow-up Disposition:  Return in about 6 months (around 6/6/2018) for Wt loss, HA f/u. ICD-10-CM ICD-9-CM    1. Frequent headaches R51 784.0 butalbital-acetaminophen-caff (FIORICET) -40 mg per capsule      naproxen (NAPROSYN) 500 mg tablet   2. Norplant in place Z97.5 V45.52    3. BMI 37.0-37.9, adult Z68.37 V85.37    4. Vaginal bleeding N93.9 623.8    5. Chronic midline low back pain without sciatica M54.5 724.2     G89.29 338.29      Orders Placed This Encounter    DISCONTD: butalbital-acetaminophen-caffeine (FIORICET, ESGIC) -40 mg per tablet     Sig: TK 1 T PO Q 3 DAYS PRF SEVERE HEADACHE/MIGRAINE     Refill:  0    DISCONTD: oxyCODONE-acetaminophen (PERCOCET) 5-325 mg per tablet     Sig: TAKE 1 TO 2 TABLETS BY MOUTH EVERY 4 TO 6 HOURS AS NEEDED FOR PAIN.      Refill:  0    butalbital-acetaminophen-caff (FIORICET) -40 mg per capsule     Sig: Take 1 tab every 3 days as needed for severe headache/migraine  Indications: HEADACHE     Dispense: 12 Cap     Refill:  0    naproxen (NAPROSYN) 500 mg tablet     Sig: Take 1 Tab by mouth two (2) times daily as needed for Pain for up to 10 days. With food     Dispense:  20 Tab     Refill:  3       Erik Naylor expressed understanding of plan. An After Visit Summary was offered/printed and given to the patient.

## 2017-12-06 NOTE — PROGRESS NOTES
Pt is here for   Chief Complaint   Patient presents with    Follow-up     6 week     Pt denies pain at this time. 1. Have you been to the ER, urgent care clinic since your last visit? Hospitalized since your last visit? No    2. Have you seen or consulted any other health care providers outside of the [address] [place] [address] since your last visit? Include any pap smears or colon screening.  No

## 2017-12-06 NOTE — PATIENT INSTRUCTIONS
Headache: Care Instructions  Your Care Instructions    Headaches have many possible causes. Most headaches aren't a sign of a more serious problem, and they will get better on their own. Home treatment may help you feel better faster. The doctor has checked you carefully, but problems can develop later. If you notice any problems or new symptoms, get medical treatment right away. Follow-up care is a key part of your treatment and safety. Be sure to make and go to all appointments, and call your doctor if you are having problems. It's also a good idea to know your test results and keep a list of the medicines you take. How can you care for yourself at home? · Do not drive if you have taken a prescription pain medicine. · Rest in a quiet, dark room until your headache is gone. Close your eyes and try to relax or go to sleep. Don't watch TV or read. · Put a cold, moist cloth or cold pack on the painful area for 10 to 20 minutes at a time. Put a thin cloth between the cold pack and your skin. · Use a warm, moist towel or a heating pad set on low to relax tight shoulder and neck muscles. · Have someone gently massage your neck and shoulders. · Take pain medicines exactly as directed. ¨ If the doctor gave you a prescription medicine for pain, take it as prescribed. ¨ If you are not taking a prescription pain medicine, ask your doctor if you can take an over-the-counter medicine. · Be careful not to take pain medicine more often than the instructions allow, because you may get worse or more frequent headaches when the medicine wears off. · Do not ignore new symptoms that occur with a headache, such as a fever, weakness or numbness, vision changes, or confusion. These may be signs of a more serious problem. To prevent headaches  · Keep a headache diary so you can figure out what triggers your headaches. Avoiding triggers may help you prevent headaches.  Record when each headache began, how long it lasted, and what the pain was like (throbbing, aching, stabbing, or dull). Write down any other symptoms you had with the headache, such as nausea, flashing lights or dark spots, or sensitivity to bright light or loud noise. Note if the headache occurred near your period. List anything that might have triggered the headache, such as certain foods (chocolate, cheese, wine) or odors, smoke, bright light, stress, or lack of sleep. · Find healthy ways to deal with stress. Headaches are most common during or right after stressful times. Take time to relax before and after you do something that has caused a headache in the past.  · Try to keep your muscles relaxed by keeping good posture. Check your jaw, face, neck, and shoulder muscles for tension, and try relaxing them. When sitting at a desk, change positions often, and stretch for 30 seconds each hour. · Get plenty of sleep and exercise. · Eat regularly and well. Long periods without food can trigger a headache. · Treat yourself to a massage. Some people find that regular massages are very helpful in relieving tension. · Limit caffeine by not drinking too much coffee, tea, or soda. But don't quit caffeine suddenly, because that can also give you headaches. · Reduce eyestrain from computers by blinking frequently and looking away from the computer screen every so often. Make sure you have proper eyewear and that your monitor is set up properly, about an arm's length away. · Seek help if you have depression or anxiety. Your headaches may be linked to these conditions. Treatment can both prevent headaches and help with symptoms of anxiety or depression. When should you call for help? Call 911 anytime you think you may need emergency care. For example, call if:  ? · You have signs of a stroke. These may include:  ¨ Sudden numbness, paralysis, or weakness in your face, arm, or leg, especially on only one side of your body. ¨ Sudden vision changes.   ¨ Sudden trouble speaking. ¨ Sudden confusion or trouble understanding simple statements. ¨ Sudden problems with walking or balance. ¨ A sudden, severe headache that is different from past headaches. ?Call your doctor now or seek immediate medical care if:  ? · You have a new or worse headache. ? · Your headache gets much worse. Where can you learn more? Go to http://melvina-jase.info/. Enter M271 in the search box to learn more about \"Headache: Care Instructions. \"  Current as of: October 14, 2016  Content Version: 11.4  © 6396-5446 [employer]. Care instructions adapted under license by [employer] (which disclaims liability or warranty for this information). If you have questions about a medical condition or this instruction, always ask your healthcare professional. [address] any warranty or liability for your use of this information. Learning About Low-Carbohydrate Diets for Weight Loss  What is a low-carbohydrate diet? Low-carb diets avoid foods that are high in carbohydrate. These high-carb foods include pasta, bread, rice, cereal, fruits, and starchy vegetables. Instead, these diets usually have you eat foods that are high in fat and protein. Many people lose weight quickly on a low-carb diet. But the early weight loss is water. People on this diet often gain the weight back after they start eating carbs again. Not all diet plans are safe or work well. A lot of the evidence shows that low-carb diets aren't healthy. That's because these diets often don't include healthy foods like fruits and vegetables. Losing weight safely means balancing protein, fat, and carbs with every meal and snack. And low-carb diets don't always provide the vitamins, minerals, and fiber you need. If you have a serious medical condition, talk to your doctor before you try any diet.  These conditions include kidney disease, heart disease, type 2 diabetes, high cholesterol, and high blood pressure. If you are pregnant, it may not be safe for your baby if you are on a low-carb diet. How can you lose weight safely? You might have heard that a diet plan helped another person lose weight. But that doesn't mean that it will work for you. It is very hard to stay on a diet that includes lots of big changes in your eating habits. If you want to get to a healthy weight and stay there, making healthy lifestyle changes will often work better than dieting. These steps can help. · Make a plan for change. Work with your doctor to create a plan that is right for you. · See a dietitian. He or she can show you how to make healthy changes in your eating habits. · Manage stress. If you have a lot of stress in your life, it can be hard to focus on making healthy changes to your daily habits. · Track your food and activity. You are likely to do better at losing weight if you keep track of what you eat and what you do. Follow-up care is a key part of your treatment and safety. Be sure to make and go to all appointments, and call your doctor if you are having problems. It's also a good idea to know your test results and keep a list of the medicines you take. Where can you learn more? Go to http://melvina-jase.info/. Enter A121 in the search box to learn more about \"Learning About Low-Carbohydrate Diets for Weight Loss. \"  Current as of: May 12, 2017  Content Version: 11.4  © 9788-5393 [employer]. Care instructions adapted under license by [employer] (which disclaims liability or warranty for this information). If you have questions about a medical condition or this instruction, always ask your healthcare professional. [address] any warranty or liability for your use of this information.

## 2017-12-06 NOTE — MR AVS SNAPSHOT
Visit Information Date & Time Provider Department Dept. Phone Encounter #  
 12/6/2017  8:00 AM Aanyeli Jacobs NP [address] [facility] [phone] [id number] Follow-up Instructions Return in about 6 months (around 6/6/2018) for Wt loss, HA f/u. Upcoming Health Maintenance Date Due  
 HPV AGE 9Y-34Y (1 of 3 - Female 3 Dose Series) 1/5/2018* PAP AKA CERVICAL CYTOLOGY 2/13/2020 DTaP/Tdap/Td series (2 - Td) 12/6/2027 *Topic was postponed. The date shown is not the original due date. Allergies as of 12/6/2017  Review Complete On: 12/6/2017 By: Anayeli Jacobs NP No Known Allergies Current Immunizations  Never Reviewed No immunizations on file. Not reviewed this visit You Were Diagnosed With   
  
 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  
 37.38 kg/m 2 2.06 m 2 Preferred Pharmacy Pharmacy Name Phone [facility] [employer] [facility]  [place] [phone] Your Updated Medication List  
  
   
This list is accurate as of: 12/6/17  9:04 AM.  Always use your most recent med list.  
  
  
  
  
 amitriptyline 25 mg tablet Commonly known as:  ELAVIL Start with 1/2 tab for a few days, if NOT too sedating, increase to 1 tab nightly. Indications: HA suppression, insomnia butalbital-acetaminophen-caff -40 mg per capsule Commonly known as:  [employer] Take 1 tab every 3 days as needed for severe headache/migraine  Indications: HEADACHE  
  
 naproxen 500 mg tablet Commonly known as:  NAPROSYN Take 1 Tab by mouth two (2) times daily as needed for Pain for up to 10 days. With food Prescriptions Printed Refills  
 butalbital-acetaminophen-caff (FIORICET) -40 mg per capsule 0 Sig: Take 1 tab every 3 days as needed for severe headache/migraine  Indications: HEADACHE Class: Print Follow-up Instructions Return in about 6 months (around 6/6/2018) for Wt loss, HA f/u. Patient Instructions Headache: Care Instructions Your Care Instructions Headaches have many possible causes. Most headaches aren't a sign of a more serious problem, and they will get better on their own. Home treatment may help you feel better faster. The doctor has checked you carefully, but problems can develop later. If you notice any problems or new symptoms, get medical treatment right away. Follow-up care is a key part of your treatment and safety. Be sure to make and go to all appointments, and call your doctor if you are having problems. It's also a good idea to know your test results and keep a list of the medicines you take. How can you care for yourself at home? · Do not drive if you have taken a prescription pain medicine. · Rest in a quiet, dark room until your headache is gone. Close your eyes and try to relax or go to sleep. Don't watch TV or read. · Put a cold, moist cloth or cold pack on the painful area for 10 to 20 minutes at a time. Put a thin cloth between the cold pack and your skin. · Use a warm, moist towel or a heating pad set on low to relax tight shoulder and neck muscles. · Have someone gently massage your neck and shoulders. · Take pain medicines exactly as directed. ¨ If the doctor gave you a prescription medicine for pain, take it as prescribed. ¨ If you are not taking a prescription pain medicine, ask your doctor if you can take an over-the-counter medicine. · Be careful not to take pain medicine more often than the instructions allow, because you may get worse or more frequent headaches when the medicine wears off. · Do not ignore new symptoms that occur with a headache, such as a fever, weakness or numbness, vision changes, or confusion. These may be signs of a more serious problem. To prevent headaches · Keep a headache diary so you can figure out what triggers your headaches. Avoiding triggers may help you prevent headaches. Record when each headache began, how long it lasted, and what the pain was like (throbbing, aching, stabbing, or dull). Write down any other symptoms you had with the headache, such as nausea, flashing lights or dark spots, or sensitivity to bright light or loud noise. Note if the headache occurred near your period. List anything that might have triggered the headache, such as certain foods (chocolate, cheese, wine) or odors, smoke, bright light, stress, or lack of sleep. · Find healthy ways to deal with stress. Headaches are most common during or right after stressful times. Take time to relax before and after you do something that has caused a headache in the past. 
· Try to keep your muscles relaxed by keeping good posture. Check your jaw, face, neck, and shoulder muscles for tension, and try relaxing them. When sitting at a desk, change positions often, and stretch for 30 seconds each hour. · Get plenty of sleep and exercise. · Eat regularly and well. Long periods without food can trigger a headache. · Treat yourself to a massage. Some people find that regular massages are very helpful in relieving tension. · Limit caffeine by not drinking too much coffee, tea, or soda.  But don't quit caffeine suddenly, because that can also give you headaches. · Reduce eyestrain from computers by blinking frequently and looking away from the computer screen every so often. Make sure you have proper eyewear and that your monitor is set up properly, about an arm's length away. · Seek help if you have depression or anxiety. Your headaches may be linked to these conditions. Treatment can both prevent headaches and help with symptoms of anxiety or depression. When should you call for help? Call 911 anytime you think you may need emergency care. For example, call if: 
? · You have signs of a stroke. These may include: 
¨ Sudden numbness, paralysis, or weakness in your face, arm, or leg, especially on only one side of your body. ¨ Sudden vision changes. ¨ Sudden trouble speaking. ¨ Sudden confusion or trouble understanding simple statements. ¨ Sudden problems with walking or balance. ¨ A sudden, severe headache that is different from past headaches. ?Call your doctor now or seek immediate medical care if: 
? · You have a new or worse headache. ? · Your headache gets much worse. Where can you learn more? Go to http://melvinaXylos Corporationjase.info/. Enter M271 in the search box to learn more about \"Headache: Care Instructions. \" Current as of: October 14, 2016 Content Version: 11.4 © 9950-6530 [employer]. Care instructions adapted under license by [employer] (which disclaims liability or warranty for this information). If you have questions about a medical condition or this instruction, always ask your healthcare professional. [place] [address] any warranty or liability for your use of this information. Learning About Low-Carbohydrate Diets for Weight Loss What is a low-carbohydrate diet? Low-carb diets avoid foods that are high in carbohydrate. These high-carb foods include pasta, bread, rice, cereal, fruits, and starchy vegetables. Instead, these diets usually have you eat foods that are high in fat and protein. Many people lose weight quickly on a low-carb diet. But the early weight loss is water. People on this diet often gain the weight back after they start eating carbs again. Not all diet plans are safe or work well. A lot of the evidence shows that low-carb diets aren't healthy. That's because these diets often don't include healthy foods like fruits and vegetables. Losing weight safely means balancing protein, fat, and carbs with every meal and snack. And low-carb diets don't always provide the vitamins, minerals, and fiber you need. If you have a serious medical condition, talk to your doctor before you try any diet. These conditions include kidney disease, heart disease, type 2 diabetes, high cholesterol, and high blood pressure. If you are pregnant, it may not be safe for your baby if you are on a low-carb diet. How can you lose weight safely? You might have heard that a diet plan helped another person lose weight. But that doesn't mean that it will work for you. It is very hard to stay on a diet that includes lots of big changes in your eating habits. If you want to get to a healthy weight and stay there, making healthy lifestyle changes will often work better than dieting. These steps can help. · Make a plan for change. Work with your doctor to create a plan that is right for you. · See a dietitian. He or she can show you how to make healthy changes in your eating habits. · Manage stress. If you have a lot of stress in your life, it can be hard to focus on making healthy changes to your daily habits. · Track your food and activity. You are likely to do better at losing weight if you keep track of what you eat and what you do. Follow-up care is a key part of your treatment and safety.  Be sure to make and go to all appointments, and call your doctor if you are having problems. It's also a good idea to know your test results and keep a list of the medicines you take. Where can you learn more? Go to http://melvina-jase.info/. Enter A121 in the search box to learn more about \"Learning About Low-Carbohydrate Diets for Weight Loss. \" Current as of: May 12, 2017 Content Version: 11.4 © 1190-7777 [employer]. Care instructions adapted under license by [employer] (which disclaims liability or warranty for this information). If you have questions about a medical condition or this instruction, always ask your healthcare professional. [place] [address] any warranty or liability for your use of this information. Introducing [facility]! [employer] introduces [employer] patient portal. Now you can access parts of your medical record, email your doctor's office, and request medication refills online. 1. In your internet browser, go to [email or web address]://Software [email or web address]. [employer]/Software 2000 2. Click on the First Time User? Click Here link in the Sign In box. You will see the New Member Sign Up page. 3. Enter your [employer] Access Code exactly as it appears below. You will not need to use this code after youve completed the sign-up process. If you do not sign up before the expiration date, you must request a new code. · [employer] Access Code: [email or web address] Expires: 12/9/2017  5:24 PM 
 
4. Enter the last four digits of your Social Security Number (xxxx) and Date of Birth (mm/dd/yyyy) as indicated and click Submit. You will be taken to the next sign-up page. 5. Create a [employer] ID. This will be your [employer] login ID and cannot be changed, so think of one that is secure and easy to remember. 6. Create a [employer] password. You can change your password at any time. 7. Enter your Password Reset Question and Answer. This can be used at a later time if you forget your password. 8. Enter your e-mail address. You will receive e-mail notification when new information is available in [address] 9. Click Sign Up. You can now view and download portions of your medical record. 10. Click the Download Summary menu link to download a portable copy of your medical information. If you have questions, please visit the Frequently Asked Questions section of the [employer] website. Remember, [employer] is NOT to be used for urgent needs. For medical emergencies, dial 911. Now available from your iPhone and Android! Please provide this summary of care documentation to your next provider. Your primary care clinician is listed as RODRIGO Benavides. If you have any questions after today's visit, please call [phone].

## 2018-06-15 ENCOUNTER — HOSPITAL ENCOUNTER (EMERGENCY)
Age: 28
Discharge: HOME | End: 2018-06-15
Attending: EMERGENCY MEDICINE
Payer: MEDICAID

## 2018-06-15 VITALS
DIASTOLIC BLOOD PRESSURE: 57 MMHG | HEART RATE: 81 BPM | WEIGHT: 219.36 LBS | RESPIRATION RATE: 18 BRPM | HEIGHT: 63 IN | OXYGEN SATURATION: 98 % | SYSTOLIC BLOOD PRESSURE: 107 MMHG | TEMPERATURE: 98.6 F | BODY MASS INDEX: 38.87 KG/M2

## 2018-06-15 DIAGNOSIS — N93.8 DUB (DYSFUNCTIONAL UTERINE BLEEDING): Primary | ICD-10-CM

## 2018-06-15 LAB
ABO + RH BLD: NORMAL
ALBUMIN SERPL-MCNC: 3.8 G/DL (ref 3.5–5)
ALBUMIN/GLOB SERPL: 0.9 {RATIO} (ref 1.1–2.2)
ALP SERPL-CCNC: 107 U/L (ref 45–117)
ALT SERPL-CCNC: 23 U/L (ref 12–78)
ANION GAP SERPL CALC-SCNC: 9 MMOL/L (ref 5–15)
APPEARANCE UR: CLEAR
AST SERPL-CCNC: 17 U/L (ref 15–37)
BACTERIA URNS QL MICRO: NEGATIVE /HPF
BASOPHILS # BLD: 0 K/UL (ref 0–0.1)
BASOPHILS NFR BLD: 1 % (ref 0–1)
BILIRUB SERPL-MCNC: 0.8 MG/DL (ref 0.2–1)
BILIRUB UR QL: NEGATIVE
BLOOD BANK CMNT PATIENT-IMP: NORMAL
BUN SERPL-MCNC: 9 MG/DL (ref 6–20)
BUN/CREAT SERPL: 11 (ref 12–20)
C TRACH DNA SPEC QL NAA+PROBE: NEGATIVE
CALCIUM SERPL-MCNC: 8.9 MG/DL (ref 8.5–10.1)
CHLORIDE SERPL-SCNC: 108 MMOL/L (ref 97–108)
CLUE CELLS VAG QL WET PREP: NORMAL
CO2 SERPL-SCNC: 21 MMOL/L (ref 21–32)
COLOR UR: ABNORMAL
CREAT SERPL-MCNC: 0.8 MG/DL (ref 0.55–1.02)
DIFFERENTIAL METHOD BLD: ABNORMAL
EOSINOPHIL # BLD: 0.2 K/UL (ref 0–0.4)
EOSINOPHIL NFR BLD: 4 % (ref 0–7)
EPITH CASTS URNS QL MICRO: ABNORMAL /LPF
ERYTHROCYTE [DISTWIDTH] IN BLOOD BY AUTOMATED COUNT: 14.7 % (ref 11.5–14.5)
GLOBULIN SER CALC-MCNC: 4.2 G/DL (ref 2–4)
GLUCOSE SERPL-MCNC: 94 MG/DL (ref 65–100)
GLUCOSE UR STRIP.AUTO-MCNC: NEGATIVE MG/DL
HCG SERPL-ACNC: <1 MIU/ML (ref 0–6)
HCG UR QL: NEGATIVE
HCT VFR BLD AUTO: 38.5 % (ref 35–47)
HGB BLD-MCNC: 12.3 G/DL (ref 11.5–16)
HGB UR QL STRIP: ABNORMAL
HYALINE CASTS URNS QL MICRO: ABNORMAL /LPF (ref 0–5)
IMM GRANULOCYTES # BLD: 0 K/UL (ref 0–0.04)
IMM GRANULOCYTES NFR BLD AUTO: 0 % (ref 0–0.5)
KETONES UR QL STRIP.AUTO: NEGATIVE MG/DL
KOH PREP SPEC: NORMAL
LEUKOCYTE ESTERASE UR QL STRIP.AUTO: ABNORMAL
LYMPHOCYTES # BLD: 2 K/UL (ref 0.8–3.5)
LYMPHOCYTES NFR BLD: 43 % (ref 12–49)
MCH RBC QN AUTO: 26.6 PG (ref 26–34)
MCHC RBC AUTO-ENTMCNC: 31.9 G/DL (ref 30–36.5)
MCV RBC AUTO: 83.2 FL (ref 80–99)
MONOCYTES # BLD: 0.5 K/UL (ref 0–1)
MONOCYTES NFR BLD: 11 % (ref 5–13)
N GONORRHOEA DNA SPEC QL NAA+PROBE: NEGATIVE
NEUTS SEG # BLD: 1.9 K/UL (ref 1.8–8)
NEUTS SEG NFR BLD: 41 % (ref 32–75)
NITRITE UR QL STRIP.AUTO: NEGATIVE
NRBC # BLD: 0 K/UL (ref 0–0.01)
NRBC BLD-RTO: 0 PER 100 WBC
PH UR STRIP: 5.5 [PH] (ref 5–8)
PLATELET # BLD AUTO: 355 K/UL (ref 150–400)
PMV BLD AUTO: 9.8 FL (ref 8.9–12.9)
POTASSIUM SERPL-SCNC: 4.1 MMOL/L (ref 3.5–5.1)
PROT SERPL-MCNC: 8 G/DL (ref 6.4–8.2)
PROT UR STRIP-MCNC: ABNORMAL MG/DL
RBC # BLD AUTO: 4.63 M/UL (ref 3.8–5.2)
RBC #/AREA URNS HPF: ABNORMAL /HPF (ref 0–5)
SAMPLE TYPE: NORMAL
SERVICE CMNT-IMP: NORMAL
SERVICE CMNT-IMP: NORMAL
SODIUM SERPL-SCNC: 138 MMOL/L (ref 136–145)
SP GR UR REFRACTOMETRY: 1.03 (ref 1–1.03)
SPECIMEN SOURCE: NORMAL
T VAGINALIS VAG QL WET PREP: NORMAL
UA: UC IF INDICATED,UAUC: ABNORMAL
UROBILINOGEN UR QL STRIP.AUTO: 0.2 EU/DL (ref 0.2–1)
WBC # BLD AUTO: 4.6 K/UL (ref 3.6–11)
WBC URNS QL MICRO: ABNORMAL /HPF (ref 0–4)

## 2018-06-15 PROCEDURE — 81025 URINE PREGNANCY TEST: CPT

## 2018-06-15 PROCEDURE — 86900 BLOOD TYPING SEROLOGIC ABO: CPT | Performed by: EMERGENCY MEDICINE

## 2018-06-15 PROCEDURE — 99284 EMERGENCY DEPT VISIT MOD MDM: CPT

## 2018-06-15 PROCEDURE — 87210 SMEAR WET MOUNT SALINE/INK: CPT | Performed by: EMERGENCY MEDICINE

## 2018-06-15 PROCEDURE — 84702 CHORIONIC GONADOTROPIN TEST: CPT | Performed by: EMERGENCY MEDICINE

## 2018-06-15 PROCEDURE — 81001 URINALYSIS AUTO W/SCOPE: CPT | Performed by: EMERGENCY MEDICINE

## 2018-06-15 PROCEDURE — 87086 URINE CULTURE/COLONY COUNT: CPT | Performed by: EMERGENCY MEDICINE

## 2018-06-15 PROCEDURE — 36415 COLL VENOUS BLD VENIPUNCTURE: CPT | Performed by: EMERGENCY MEDICINE

## 2018-06-15 PROCEDURE — 85025 COMPLETE CBC W/AUTO DIFF WBC: CPT | Performed by: EMERGENCY MEDICINE

## 2018-06-15 PROCEDURE — 87491 CHLMYD TRACH DNA AMP PROBE: CPT | Performed by: EMERGENCY MEDICINE

## 2018-06-15 PROCEDURE — 80053 COMPREHEN METABOLIC PANEL: CPT | Performed by: EMERGENCY MEDICINE

## 2018-06-15 NOTE — DISCHARGE INSTRUCTIONS
Abnormal Uterine Bleeding: Care Instructions  Your Care Instructions    Abnormal uterine bleeding (AUB) is irregular bleeding from the uterus that is longer or heavier than usual or does not occur at your regular time. Sometimes it is caused by changes in hormone levels. It can also be caused by growths in the uterus, such as fibroids or polyps. Sometimes a cause cannot be found. You may have heavy bleeding when you are not expecting your period. Your doctor may suggest a pregnancy test, if you think you are pregnant. Follow-up care is a key part of your treatment and safety. Be sure to make and go to all appointments, and call your doctor if you are having problems. It's also a good idea to know your test results and keep a list of the medicines you take. How can you care for yourself at home? · Be safe with medicines. Take pain medicines exactly as directed. ¨ If the doctor gave you a prescription medicine for pain, take it as prescribed. ¨ If you are not taking a prescription pain medicine, ask your doctor if you can take an over-the-counter medicine. · You may be low in iron because of blood loss. Eat a balanced diet that is high in iron and vitamin C. Foods rich in iron include red meat, shellfish, eggs, beans, and leafy green vegetables. Talk to your doctor about whether you need to take iron pills or a multivitamin. When should you call for help? Call 911 anytime you think you may need emergency care. For example, call if:  ? · You passed out (lost consciousness). ?Call your doctor now or seek immediate medical care if:  ? · You have new or worse belly or pelvic pain. ? · You have severe vaginal bleeding. ? · You feel dizzy or lightheaded, or you feel like you may faint. ? Watch closely for changes in your health, and be sure to contact your doctor if:  ? · You think you may be pregnant. ? · Your bleeding gets worse. ? · You do not get better as expected.    Where can you learn more?  Go to http://melvina-jase.info/. Enter K414 in the search box to learn more about \"Abnormal Uterine Bleeding: Care Instructions. \"  Current as of: October 13, 2016  Content Version: 11.4  © 1510-2819 [employer]. Care instructions adapted under license by [employer] (which disclaims liability or warranty for this information). If you have questions about a medical condition or this instruction, always ask your healthcare professional. [place] [address] any warranty or liability for your use of this information.

## 2018-06-15 NOTE — ED PROVIDER NOTES
EMERGENCY DEPARTMENT HISTORY AND PHYSICAL EXAM      Date: 6/15/2018  Patient Name: Joao Phelps    History of Presenting Illness     Chief Complaint   Patient presents with    Vaginal Pain    Vaginal Bleeding     pt reports bleeding for the past 3 weeks with increase in heaviness since the last 3 days. pt states \"saima if i was pregnant or something and miscarried or not\"    Abdominal Pain     lower medial x 3 days    Nausea       History Provided By: Patient    HPI: Joao Phelps, 33 y.o. female presents ambulatory to the ED with cc of mild to moderate vaginal bleeding x 3 weeks that worsened x the past few days. She reports associated mild ABD cramping, nausea and episodes of diarrhea. She states she is going through 4 pads/day. She notes she has Nexplanon. She denies taking a pregnancy test recently. She denies tobacco, alcohol or drug use. Pt specifically denies any vaginal discharge, fever, or vomiting. There are no other complaints, changes, or physical findings at this time. PCP: Candelaria Christopher NP    Current Outpatient Prescriptions   Medication Sig Dispense Refill    butalbital-acetaminophen-caff (FIORICET) -40 mg per capsule Take 1 tab every 3 days as needed for severe headache/migraine  Indications: HEADACHE 12 Cap 0    amitriptyline (ELAVIL) 25 mg tablet Start with 1/2 tab for a few days, if NOT too sedating, increase to 1 tab nightly. Indications: HA suppression, insomnia 30 Tab 11       Past History     Past Medical History:  Past Medical History:   Diagnosis Date    Headache     Ill-defined condition     Vaginal Birth 9[birth year]4/17       Past Surgical History:  No past surgical history on file. Family History:  No family history on file.     Social History:  Social History   Substance Use Topics    Smoking status: Never Smoker    Smokeless tobacco: Never Used    Alcohol use No       Allergies:  No Known Allergies      Review of Systems   Review of Systems   Constitutional: Negative for activity change, appetite change, fatigue and fever. HENT: Negative. Negative for congestion, rhinorrhea and sore throat. Respiratory: Negative. Negative for cough, shortness of breath and wheezing. Cardiovascular: Negative. Negative for chest pain and leg swelling. Gastrointestinal: Positive for abdominal pain, diarrhea and nausea. Negative for abdominal distention, constipation and vomiting. Endocrine: Negative. Genitourinary: Positive for vaginal bleeding. Negative for difficulty urinating, dysuria, menstrual problem and vaginal discharge. Musculoskeletal: Negative. Negative for arthralgias, joint swelling and myalgias. Skin: Negative. Negative for rash. Neurological: Negative. Negative for dizziness, weakness, light-headedness and headaches. Psychiatric/Behavioral: Negative. Physical Exam   Physical Exam   Constitutional: She is oriented to person, place, and time. She appears well-developed and well-nourished. HENT:   Head: Atraumatic. Eyes: EOM are normal.   Cardiovascular: Normal rate, regular rhythm, normal heart sounds and intact distal pulses. Exam reveals no gallop and no friction rub. No murmur heard. Pulmonary/Chest: Effort normal and breath sounds normal. No respiratory distress. She has no wheezes. She has no rales. She exhibits no tenderness. Abdominal: Soft. Bowel sounds are normal. She exhibits no distension and no mass. There is no tenderness. There is no rebound and no guarding. Genitourinary:   Genitourinary Comments: Pelvic Exam: Dark blood in cul-de-sac, cleared with 2 scopettes. No cervicitis. No CMT. No adnexal tenderness. Positive odor. Musculoskeletal: Normal range of motion. She exhibits no edema or tenderness. Neurological: She is oriented to person, place, and time. Skin: Skin is warm. Psychiatric: She has a normal mood and affect. Nursing note and vitals reviewed.     Diagnostic Study Results     Labs -     Recent Results (from the past 12 hour(s))   CBC WITH AUTOMATED DIFF    Collection Time: 06/15/18  5:09 AM   Result Value Ref Range    WBC 4.6 3.6 - 11.0 K/uL    RBC 4.63 3.80 - 5.20 M/uL    HGB 12.3 11.5 - 16.0 g/dL    HCT 38.5 35.0 - 47.0 %    MCV 83.2 80.0 - 99.0 FL    MCH 26.6 26.0 - 34.0 PG    MCHC 31.9 30.0 - 36.5 g/dL    RDW 14.7 (H) 11.5 - 14.5 %    PLATELET 637 658 - 031 K/uL    MPV 9.8 8.9 - 12.9 FL    NRBC 0.0 0  WBC    ABSOLUTE NRBC 0.00 0.00 - 0.01 K/uL    NEUTROPHILS 41 32 - 75 %    LYMPHOCYTES 43 12 - 49 %    MONOCYTES 11 5 - 13 %    EOSINOPHILS 4 0 - 7 %    BASOPHILS 1 0 - 1 %    IMMATURE GRANULOCYTES 0 0.0 - 0.5 %    ABS. NEUTROPHILS 1.9 1.8 - 8.0 K/UL    ABS. LYMPHOCYTES 2.0 0.8 - 3.5 K/UL    ABS. MONOCYTES 0.5 0.0 - 1.0 K/UL    ABS. EOSINOPHILS 0.2 0.0 - 0.4 K/UL    ABS. BASOPHILS 0.0 0.0 - 0.1 K/UL    ABS. IMM. GRANS. 0.0 0.00 - 0.04 K/UL    DF AUTOMATED     METABOLIC PANEL, COMPREHENSIVE    Collection Time: 06/15/18  5:09 AM   Result Value Ref Range    Sodium 138 136 - 145 mmol/L    Potassium 4.1 3.5 - 5.1 mmol/L    Chloride 108 97 - 108 mmol/L    CO2 21 21 - 32 mmol/L    Anion gap 9 5 - 15 mmol/L    Glucose 94 65 - 100 mg/dL    BUN 9 6 - 20 MG/DL    Creatinine 0.80 0.55 - 1.02 MG/DL    BUN/Creatinine ratio 11 (L) 12 - 20      GFR est AA >60 >60 ml/min/1.73m2    GFR est non-AA >60 >60 ml/min/1.73m2    Calcium 8.9 8.5 - 10.1 MG/DL    Bilirubin, total 0.8 0.2 - 1.0 MG/DL    ALT (SGPT) 23 12 - 78 U/L    AST (SGOT) 17 15 - 37 U/L    Alk.  phosphatase 107 45 - 117 U/L    Protein, total 8.0 6.4 - 8.2 g/dL    Albumin 3.8 3.5 - 5.0 g/dL    Globulin 4.2 (H) 2.0 - 4.0 g/dL    A-G Ratio 0.9 (L) 1.1 - 2.2     URINALYSIS W/ REFLEX CULTURE    Collection Time: 06/15/18  5:09 AM   Result Value Ref Range    Color YELLOW/STRAW      Appearance CLEAR CLEAR      Specific gravity 1.029 1.003 - 1.030      pH (UA) 5.5 5.0 - 8.0      Protein TRACE (A) NEG mg/dL    Glucose NEGATIVE  NEG mg/dL    Ketone NEGATIVE  NEG mg/dL Bilirubin NEGATIVE  NEG      Blood LARGE (A) NEG      Urobilinogen 0.2 0.2 - 1.0 EU/dL    Nitrites NEGATIVE  NEG      Leukocyte Esterase SMALL (A) NEG      WBC 5-10 0 - 4 /hpf    RBC 5-10 0 - 5 /hpf    Epithelial cells FEW FEW /lpf    Bacteria NEGATIVE  NEG /hpf    UA:UC IF INDICATED URINE CULTURE ORDERED (A) CNI      Hyaline cast 0-2 0 - 5 /lpf   BLOOD TYPE, (ABO+RH)    Collection Time: 06/15/18  5:09 AM   Result Value Ref Range    ABO/Rh(D) B POSITIVE    TOTAL HCG, QT. Collection Time: 06/15/18  5:09 AM   Result Value Ref Range    Beta HCG, QT <1 0 - 6 MIU/ML   BHARATHI, OTHER SOURCES    Collection Time: 06/15/18  5:15 AM   Result Value Ref Range    Special Requests: NO SPECIAL REQUESTS      KOH NO YEAST SEEN     HCG URINE, QL. - POC    Collection Time: 06/15/18  5:19 AM   Result Value Ref Range    Pregnancy test,urine (POC) NEGATIVE  NEG           Medical Decision Making   I am the first provider for this patient. I reviewed the vital signs, available nursing notes, past medical history, past surgical history, family history and social history. Vital Signs-Reviewed the patient's vital signs. Patient Vitals for the past 12 hrs:   Temp Pulse Resp BP SpO2   06/15/18 0539 98.6 °F (37 °C) 81 18 107/57 98 %   06/15/18 0402 - - 17 - -       Pulse Oximetry Analysis - 100% on RA    Cardiac Monitor:   Rate: 81 bpm    Records Reviewed: Nursing Notes and Old Medical Records    Provider Notes (Medical Decision Making):   DDx: DUB, pregnancy, miscarriage, UTI, STD    ED Course:   Initial assessment performed. The patients presenting problems have been discussed, and they are in agreement with the care plan formulated and outlined with them. I have encouraged them to ask questions as they arise throughout their visit. Procedure Note - Pelvic Exam:    5:12 AM  Performed by: Priscilla Wellington MD  Chaperoned by: RN  Pelvic exam was performed using bimanual and speculum.  Further findings noted in physical exam.   The procedure took 1-15 minutes, and pt tolerated well. Critical Care Time:   0    Disposition:  DISCHARGE NOTE  6:25 AM  The patient has been re-evaluated and is ready for discharge. Reviewed available results with patient. Counseled pt on diagnosis and care plan. Pt has expressed understanding, and all questions have been answered. Pt agrees with plan and agrees to follow up as recommended, or return to the ED if their symptoms worsen. Discharge instructions have been provided and explained to the pt, along with reasons to return to the ED. PLAN:  1. Current Discharge Medication List        2. Follow-up Information     Follow up With Details Comments Contact Info    [facility] DEPARTMENT OF OB/GYN In 1 week  [address] [address]  [phone]    [address] [facility] In 1 week [place]  [address] [place] [address] In 1 week  [address]  [address]   [address] [address]          Return to ED if worse     Diagnosis     Clinical Impression:   1. DUB (dysfunctional uterine bleeding)        Attestations: This note is prepared by Christy Carl, acting as Scribe for Melanie Rinne, MD.    Melanie Rinne, MD: The scribe's documentation has been prepared under my direction and personally reviewed by me in its entirety. I confirm that the note above accurately reflects all work, treatment, procedures, and medical decision making performed by me.

## 2018-06-15 NOTE — LETTER
[facility] EMERGENCY DEPT 
[address] [id number] [phone] Work/School Note Date: 6/15/2018 To Whom It May concern: 
 
Mari Colon was seen and treated today in the emergency room by the following provider(s): 
Attending Provider: Azam Casey MD.   
 
Mari Colon may return to work on 06/16/18. Sincerely, Naomie Ricardo RN

## 2018-06-16 LAB
BACTERIA SPEC CULT: NORMAL
CC UR VC: NORMAL
SERVICE CMNT-IMP: NORMAL

## 2018-08-14 ENCOUNTER — OFFICE VISIT (OUTPATIENT)
Dept: INTERNAL MEDICINE CLINIC | Age: 28
End: 2018-08-14

## 2018-08-14 VITALS
DIASTOLIC BLOOD PRESSURE: 72 MMHG | BODY MASS INDEX: 37.78 KG/M2 | RESPIRATION RATE: 18 BRPM | HEIGHT: 63 IN | TEMPERATURE: 97.9 F | WEIGHT: 213.2 LBS | HEART RATE: 98 BPM | OXYGEN SATURATION: 97 % | SYSTOLIC BLOOD PRESSURE: 129 MMHG

## 2018-08-14 DIAGNOSIS — R51.9 FREQUENT HEADACHES: ICD-10-CM

## 2018-08-14 DIAGNOSIS — M79.89 SWELLING OF BOTH HANDS: ICD-10-CM

## 2018-08-14 DIAGNOSIS — S69.92XA INJURY OF LEFT WRIST, INITIAL ENCOUNTER: ICD-10-CM

## 2018-08-14 DIAGNOSIS — M54.50 CHRONIC MIDLINE LOW BACK PAIN WITHOUT SCIATICA: Primary | ICD-10-CM

## 2018-08-14 DIAGNOSIS — G89.29 CHRONIC MIDLINE LOW BACK PAIN WITHOUT SCIATICA: Primary | ICD-10-CM

## 2018-08-14 DIAGNOSIS — Z86.61: ICD-10-CM

## 2018-08-14 RX ORDER — BUTALBITAL, ACETAMINOPHEN AND CAFFEINE 300; 40; 50 MG/1; MG/1; MG/1
CAPSULE ORAL
Qty: 12 CAP | Refills: 0 | Status: SHIPPED | OUTPATIENT
Start: 2018-08-14 | End: 2019-10-03 | Stop reason: ALTCHOICE

## 2018-08-14 RX ORDER — ORPHENADRINE CITRATE 100 MG/1
100 TABLET, EXTENDED RELEASE ORAL
Qty: 40 TAB | Refills: 2 | Status: SHIPPED | OUTPATIENT
Start: 2018-08-14 | End: 2019-10-03 | Stop reason: ALTCHOICE

## 2018-08-14 RX ORDER — BUTALBITAL, ACETAMINOPHEN AND CAFFEINE 300; 40; 50 MG/1; MG/1; MG/1
CAPSULE ORAL
Qty: 12 CAP | Refills: 0 | Status: SHIPPED | OUTPATIENT
Start: 2018-08-14 | End: 2018-08-14 | Stop reason: SDUPTHER

## 2018-08-14 RX ORDER — MELOXICAM 7.5 MG/1
7.5 TABLET ORAL DAILY
Qty: 30 TAB | Refills: 5 | Status: SHIPPED | OUTPATIENT
Start: 2018-08-14 | End: 2018-12-19 | Stop reason: SDUPTHER

## 2018-08-14 RX ORDER — PROPRANOLOL HYDROCHLORIDE 40 MG/1
40 TABLET ORAL 2 TIMES DAILY
Qty: 60 TAB | Refills: 5 | Status: SHIPPED | OUTPATIENT
Start: 2018-08-14 | End: 2018-12-19
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
## 2018-08-14 NOTE — PATIENT INSTRUCTIONS
Learning About How to Have a Healthy Back  What causes back pain? Back pain is often caused by overuse, strain, or injury. For example, people often hurt their backs playing sports or working in the yard, being jolted in a car accident, or lifting something too heavy. Aging plays a part too. Your bones and muscles tend to lose strength as you age, which makes injury more likely. The spongy discs between the bones of the spine (vertebrae) may suffer from wear and tear and no longer provide enough cushion between the bones. A disc that bulges or breaks open (herniated disc) can press on nerves, causing back pain. In some people, back pain is the result of arthritis, broken vertebrae caused by bone loss (osteoporosis), illness, or a spine problem. Although most people have back pain at one time or another, there are steps you can take to make it less likely. How can you have a healthy back? Reduce stress on your back through good posture  Slumping or slouching alone may not cause low back pain. But after the back has been strained or injured, bad posture can make pain worse. · Sleep in a position that maintains your back's normal curves and on a mattress that feels comfortable. Sleep on your side with a pillow between your knees, or sleep on your back with a pillow under your knees. These positions can reduce strain on your back. · Stand and sit up straight. \"Good posture\" generally means your ears, shoulders, and hips are in a straight line. · If you must stand for a long time, put one foot on a stool, ledge, or box. Switch feet every now and then. · Sit in a chair that is low enough to let you place both feet flat on the floor with both knees nearly level with your hips. If your chair or desk is too high, use a footrest to raise your knees. Place a small pillow, a rolled-up towel, or a lumbar roll in the curve of your back if you need extra support.   · Try a kneeling chair, which helps tilt your hips forward. This takes pressure off your lower back. · Try sitting on an exercise ball. It can rock from side to side, which helps keep your back loose. · When driving, keep your knees nearly level with your hips. Sit straight, and drive with both hands on the steering wheel. Your arms should be in a slightly bent position. Reduce stress on your back through careful lifting  · Squat down, bending at the hips and knees only. If you need to, put one knee to the floor and extend your other knee in front of you, bent at a right angle (half kneeling). · Press your chest straight forward. This helps keep your upper back straight while keeping a slight arch in your low back. · Hold the load as close to your body as possible, at the level of your belly button (navel). · Use your feet to change direction, taking small steps. · Lead with your hips as you change direction. Keep your shoulders in line with your hips as you move. · Set down your load carefully, squatting with your knees and hips only. Exercise and stretch your back  · Do some exercise on most days of the week, if your doctor says it is okay. You can walk, run, swim, or cycle. · Stretch your back muscles. Here are a few exercises to try:  [place] on your back, and gently pull one bent knee to your chest. Put that foot back on the floor, and then pull the other knee to your chest.  ¨ Do pelvic tilts. Lie on your back with your knees bent. Tighten your stomach muscles. Pull your belly button (navel) in and up toward your ribs. You should feel like your back is pressing to the floor and your hips and pelvis are slightly lifting off the floor. Hold for 6 seconds while breathing smoothly. ¨ Sit with your back flat against a wall. · Keep your core muscles strong. The muscles of your back, belly (abdomen), and buttocks support your spine. ¨ Pull in your belly and imagine pulling your navel toward your spine. Hold this for 6 seconds, then relax.  Remember to keep breathing normally as you tense your muscles. ¨ Do curl-ups. Always do them with your knees bent. Keep your low back on the floor, and curl your shoulders toward your knees using a smooth, slow motion. Keep your arms folded across your chest. If this bothers your neck, try putting your hands behind your neck (not your head), with your elbows spread apart. ¨ Lie on your back with your knees bent and your feet flat on the floor. Tighten your belly muscles, and then push with your feet and raise your buttocks up a few inches. Hold this position 6 seconds as you continue to breathe normally, then lower yourself slowly to the floor. Repeat 8 to 12 times. ¨ If you like group exercise, try Pilates or yoga. These classes have poses that strengthen the core muscles. Lead a healthy lifestyle  · Stay at a healthy weight to avoid strain on your back. · Do not smoke. Smoking increases the risk of osteoporosis, which weakens the spine. If you need help quitting, talk to your doctor about stop-smoking programs and medicines. These can increase your chances of quitting for good. Where can you learn more? Go to http://melvinaTransferWisejase.info/. Enter L315 in the search box to learn more about \"Learning About How to Have a Healthy Back. \"  Current as of: November 29, 2017  Content Version: 11.7  © 0507-7614 [employer]. Care instructions adapted under license by [employer] (which disclaims liability or warranty for this information). If you have questions about a medical condition or this instruction, always ask your healthcare professional. [place] [address] any warranty or liability for your use of this information. Low Back Pain: Exercises  Your Care Instructions  Here are some examples of typical rehabilitation exercises for your condition. Start each exercise slowly. Ease off the exercise if you start to have pain.   Your doctor or physical therapist will tell you when you can start these exercises and which ones will work best for you. How to do the exercises  Press-up    1. Lie on your stomach, supporting your body with your forearms. 2. Press your elbows down into the floor to raise your upper back. As you do this, relax your stomach muscles and allow your back to arch without using your back muscles. As your press up, do not let your hips or pelvis come off the floor. 3. Hold for 15 to 30 seconds, then relax. 4. Repeat 2 to 4 times. Alternate arm and leg (bird dog) exercise    Do this exercise slowly. Try to keep your body straight at all times, and do not let one hip drop lower than the other. 1. Start on the floor, on your hands and knees. 2. Tighten your belly muscles. 3. Raise one leg off the floor, and hold it straight out behind you. Be careful not to let your hip drop down, because that will twist your trunk. 4. Hold for about 6 seconds, then lower your leg and switch to the other leg. 5. Repeat 8 to 12 times on each leg. 6. Over time, work up to holding for 10 to 30 seconds each time. 7. If you feel stable and secure with your leg raised, try raising the opposite arm straight out in front of you at the same time. Knee-to-chest exercise    1. Lie on your back with your knees bent and your feet flat on the floor. 2. Bring one knee to your chest, keeping the other foot flat on the floor (or keeping the other leg straight, whichever feels better on your lower back). 3. Keep your lower back pressed to the floor. Hold for at least 15 to 30 seconds. 4. Relax, and lower the knee to the starting position. 5. Repeat with the other leg. Repeat 2 to 4 times with each leg. 6. To get more stretch, put your other leg flat on the floor while pulling your knee to your chest.  Curl-ups    1. Lie on the floor on your back with your knees bent at a 90-degree angle. Your feet should be flat on the floor, about 12 inches from your buttocks.   2. Cross your arms over your chest. If this bothers your neck, try putting your hands behind your neck (not your head), with your elbows spread apart. 3. Slowly tighten your belly muscles and raise your shoulder blades off the floor. 4. Keep your head in line with your body, and do not press your chin to your chest.  5. Hold this position for 1 or 2 seconds, then slowly lower yourself back down to the floor. 6. Repeat 8 to 12 times. Pelvic tilt exercise    1. Lie on your back with your knees bent. 2. \"Brace\" your stomach. This means to tighten your muscles by pulling in and imagining your belly button moving toward your spine. You should feel like your back is pressing to the floor and your hips and pelvis are rocking back. 3. Hold for about 6 seconds while you breathe smoothly. 4. Repeat 8 to 12 times. Heel dig bridging    1. Lie on your back with both knees bent and your ankles bent so that only your heels are digging into the floor. Your knees should be bent about 90 degrees. 2. Then push your heels into the floor, squeeze your buttocks, and lift your hips off the floor until your shoulders, hips, and knees are all in a straight line. 3. Hold for about 6 seconds as you continue to breathe normally, and then slowly lower your hips back down to the floor and rest for up to 10 seconds. 4. Do 8 to 12 repetitions. Hamstring stretch in doorway    1. Lie on your back in a doorway, with one leg through the open door. 2. Slide your leg up the wall to straighten your knee. You should feel a gentle stretch down the back of your leg. 3. Hold the stretch for at least 15 to 30 seconds. Do not arch your back, point your toes, or bend either knee. Keep one heel touching the floor and the other heel touching the wall. 4. Repeat with your other leg. 5. Do 2 to 4 times for each leg. Hip flexor stretch    1. Kneel on the floor with one knee bent and one leg behind you. Place your forward knee over your foot.  Keep your other knee touching the floor. 2. Slowly push your hips forward until you feel a stretch in the upper thigh of your rear leg. 3. Hold the stretch for at least 15 to 30 seconds. Repeat with your other leg. 4. Do 2 to 4 times on each side. Wall sit    1. Stand with your back 10 to 12 inches away from a wall. 2. Lean into the wall until your back is flat against it. 3. Slowly slide down until your knees are slightly bent, pressing your lower back into the wall. 4. Hold for about 6 seconds, then slide back up the wall. 5. Repeat 8 to 12 times. Follow-up care is a key part of your treatment and safety. Be sure to make and go to all appointments, and call your doctor if you are having problems. It's also a good idea to know your test results and keep a list of the medicines you take. Where can you learn more? Go to http://melvinaN30 Pharmaceuticalsjase.info/. Enter S008 in the search box to learn more about \"Low Back Pain: Exercises. \"  Current as of: November 29, 2017  Content Version: 11.7  © 0218-1899 [employer]. Care instructions adapted under license by [employer] (which disclaims liability or warranty for this information). If you have questions about a medical condition or this instruction, always ask your healthcare professional. [address] any warranty or liability for your use of this information. Learning About RICE (Rest, Ice, Compression, and Elevation)  What is RICE? RICE is a way to care for an injury. RICE helps relieve pain and swelling. It may also help with healing and flexibility. RICE stands for:  · Rest and protect the injured or sore area. · Ice or a cold pack used as soon as possible. · Compression, or wrapping the injured or sore area with an elastic bandage. · Elevation (propping up) the injured or sore area. How do you do RICE?   You can use RICE for home treatment when you have general aches and pains or after an injury or surgery. Rest  · Do not put weight on the injury for at least 24 to 48 hours. · Use crutches for a badly sprained knee or ankle. · Support a sprained wrist, elbow, or shoulder with a sling. Ice  · Put ice or a cold pack on the injury right away to reduce pain and swelling. Frozen vegetables will also work as an ice pack. Put a thin cloth between the ice or cold pack and your skin. The cloth protects the injured area from getting too cold. · Use ice for 10 to 15 minutes at a time for the first 48 to 72 hours. Compression  · Use compression for sprains, strains, and surgeries of the arms and legs. · Wrap the injured area with an elastic bandage or compression sleeve to reduce swelling. · Don't wrap it too tightly. If the area below it feels numb, tingles, or feels cool, loosen the wrap. Elevation  · Use elevation for areas of the body that can be propped up, such as arms and legs. · Prop up the injured area on pillows whenever you use ice. Keep it propped up anytime you sit or lie down. · Try to keep the injured area at or above the level of your heart. This will help reduce swelling and bruising. Where can you learn more? Go to http://melvina-jase.info/. Enter K845 in the search box to learn more about \"Learning About RICE (Rest, Ice, Compression, and Elevation). \"  Current as of: November 29, 2017  Content Version: 11.7  © 7895-2947 [employer]. Care instructions adapted under license by [employer] (which disclaims liability or warranty for this information). If you have questions about a medical condition or this instruction, always ask your healthcare professional. [place] [address] any warranty or liability for your use of this information.

## 2018-08-14 NOTE — MR AVS SNAPSHOT
[address] [place] [address] [id number] [phone] Patient: Mis Beebe MRN: [id number] :1990 Visit Information Date & Time Provider Department Dept. Phone Encounter #  
 2018  2:40 PM Elizabeth Armando NP [facility] [facility] [phone] [id number] Follow-up Instructions Return in about 8 weeks (around 10/9/2018) for LBP, HA , left wrist pain f/u. Upcoming Health Maintenance Date Due  
 HPV Age 9Y-34Y (1 of 1 - Female 3 Dose Series) 2002 Influenza Age 5 to Adult 2018 PAP AKA CERVICAL CYTOLOGY 2020 DTaP/Tdap/Td series (2 - Td) 2027 Allergies as of 2018  Review Complete On: 2018 By: Jesus Tran LPN No Known Allergies Current Immunizations  Never Reviewed No immunizations on file. Not reviewed this visit You Were Diagnosed With   
  
 Codes Comments Chronic midline low back pain without sciatica    -  Primary ICD-10-CM: M54.5, G89.29 ICD-9-CM: 724.2, 338.29 Frequent headaches     ICD-10-CM: R51 ICD-9-CM: 784.0 Injury of left wrist, initial encounter     ICD-10-CM: V74.05VI ICD-9-CM: 321. 3 Swelling of both hands     ICD-10-CM: M79.89 ICD-9-CM: 729.81   
 BMI 37.0-37.9, adult     ICD-10-CM: Z68.37 ICD-9-CM: V85.37 Vitals BP Pulse Temp Resp Height(growth percentile) Weight(growth percentile) 129/72 (BP 1 Location: Right arm, BP Patient Position: Sitting) 98 97.9 °F (36.6 °C) (Oral) 18 5' 3\" (1.6 m) 213 lb 3.2 oz (96.7 kg) LMP SpO2 BMI OB Status Smoking Status 2017 (Approximate) 97% 37.77 kg/m2 Having regular periods Never Smoker Vitals History BMI and BSA Data Body Mass Index Body Surface Area  
 37.77 kg/m 2 2.07 m 2 Preferred Pharmacy Pharmacy Name Phone  [address] [facility],[address], [address] [place] [address] [phone] Your Updated Medication List  
  
   
This list is accurate as of 8/14/18  5:26 PM.  Always use your most recent med list.  
  
  
  
  
 butalbital-acetaminophen-caff -40 mg per capsule Commonly known as:  [employer] Take 1 tab every 3 days as needed for severe headache/migraine  Indications: HEADACHE  
  
 meloxicam 7.5 mg tablet Commonly known as:  MOBIC Take 1 Tab by mouth daily. orphenadrine citrate 100 mg sr tablet Commonly known as:  NORFLEX Take 1 Tab by mouth two (2) times daily as needed. Indications: Muscle Spasm  
  
 propranolol 40 mg tablet Commonly known as:  INDERAL Take 1 Tab by mouth two (2) times a day. Indications: HA suppression Prescriptions Sent to Pharmacy Refills  
 meloxicam (MOBIC) 7.5 mg tablet 5 Sig: Take 1 Tab by mouth daily. Class: Normal  
 Pharmacy: [facility] [address] Ph #: [phone] Route: Oral  
 orphenadrine citrate (NORFLEX) 100 mg sr tablet 2 Sig: Take 1 Tab by mouth two (2) times daily as needed. Indications: Muscle Spasm Class: Normal  
 Pharmacy: [facility] [address] Ph #: [phone] Route: Oral  
 propranolol (INDERAL) 40 mg tablet 5 Sig: Take 1 Tab by mouth two (2) times a day. Indications: HA suppression Class: Normal  
 Pharmacy: [facility] [address] Ph #: [phone] Route: Oral  
 butalbital-acetaminophen-caff (FIORICET) -40 mg per capsule 0 Sig: Take 1 tab every 3 days as needed for severe headache/migraine  Indications: HEADACHE Class: Normal  
 Pharmacy: [facility] [address] Ph #: [phone] We Performed the Following REFERRAL TO NEUROLOGY [ELF79 Custom] REFERRAL TO PHYSICAL THERAPY [SQX16 Custom] Comments:  
 Please allow maximum allowable visits per insurance to include iontophoresis/phonophoresis. No PAINFUL ROM. Aquatic therapy if indicated. For lower back pain and frequent headaches (neck pain suspected) [facility] at location of pt choice [facility]- [phone] [employer]- [phone] [employer]-[phone] [employer]- [phone] Follow-up Instructions Return in about 8 weeks (around 10/9/2018) for LBP, HA , left wrist pain f/u. To-Do List   
 08/14/2018 Imaging:  XR WRIST LT AP/LAT Referral Information Referral ID Referred By Referred To  
  
 [id number] [facility] [address], [address] Phone: [phone] Fax: [phone] Visits Status Start Date End Date 1 New Request 8/14/18 8/14/19 If your referral has a status of pending review or denied, additional information will be sent to support the outcome of this decision. Referral ID Referred By Referred To  
 [id number] [facility] PHYSICAL THERAPY  
   [address], [address] Phone: [phone] Visits Status Start Date End Date 1 New Request 8/14/18 8/14/19 If your referral has a status of pending review or denied, additional information will be sent to support the outcome of this decision. Patient Instructions Learning About How to Have a Healthy Back What causes back pain? Back pain is often caused by overuse, strain, or injury. For example, people often hurt their backs playing sports or working in the yard, being jolted in a car accident, or lifting something too heavy. Aging plays a part too.  Your bones and muscles tend to lose strength as you age, which makes injury more likely. The spongy discs between the bones of the spine (vertebrae) may suffer from wear and tear and no longer provide enough cushion between the bones. A disc that bulges or breaks open (herniated disc) can press on nerves, causing back pain. In some people, back pain is the result of arthritis, broken vertebrae caused by bone loss (osteoporosis), illness, or a spine problem. Although most people have back pain at one time or another, there are steps you can take to make it less likely. How can you have a healthy back? Reduce stress on your back through good posture Slumping or slouching alone may not cause low back pain. But after the back has been strained or injured, bad posture can make pain worse. · Sleep in a position that maintains your back's normal curves and on a mattress that feels comfortable. Sleep on your side with a pillow between your knees, or sleep on your back with a pillow under your knees. These positions can reduce strain on your back. · Stand and sit up straight. \"Good posture\" generally means your ears, shoulders, and hips are in a straight line. · If you must stand for a long time, put one foot on a stool, ledge, or box. Switch feet every now and then. · Sit in a chair that is low enough to let you place both feet flat on the floor with both knees nearly level with your hips. If your chair or desk is too high, use a footrest to raise your knees. Place a small pillow, a rolled-up towel, or a lumbar roll in the curve of your back if you need extra support. · Try a kneeling chair, which helps tilt your hips forward. This takes pressure off your lower back. · Try sitting on an exercise ball. It can rock from side to side, which helps keep your back loose. · When driving, keep your knees nearly level with your hips. Sit straight, and drive with both hands on the steering wheel. Your arms should be in a slightly bent position. Reduce stress on your back through careful lifting · Squat down, bending at the hips and knees only. If you need to, put one knee to the floor and extend your other knee in front of you, bent at a right angle (half kneeling). · Press your chest straight forward. This helps keep your upper back straight while keeping a slight arch in your low back. · Hold the load as close to your body as possible, at the level of your belly button (navel). · Use your feet to change direction, taking small steps. · Lead with your hips as you change direction. Keep your shoulders in line with your hips as you move. · Set down your load carefully, squatting with your knees and hips only. Exercise and stretch your back · Do some exercise on most days of the week, if your doctor says it is okay. You can walk, run, swim, or cycle. · Stretch your back muscles. Here are a few exercises to try: ¨ Lie on your back, and gently pull one bent knee to your chest. Put that foot back on the floor, and then pull the other knee to your chest. 
¨ Do pelvic tilts. Lie on your back with your knees bent. Tighten your stomach muscles. Pull your belly button (navel) in and up toward your ribs. You should feel like your back is pressing to the floor and your hips and pelvis are slightly lifting off the floor. Hold for 6 seconds while breathing smoothly. ¨ Sit with your back flat against a wall. · Keep your core muscles strong. The muscles of your back, belly (abdomen), and buttocks support your spine. ¨ Pull in your belly and imagine pulling your navel toward your spine. Hold this for 6 seconds, then relax. Remember to keep breathing normally as you tense your muscles. ¨ Do curl-ups. Always do them with your knees bent. Keep your low back on the floor, and curl your shoulders toward your knees using a smooth, slow motion.  Keep your arms folded across your chest. If this bothers your neck, try putting your hands behind your neck (not your head), with your elbows spread apart. ¨ Lie on your back with your knees bent and your feet flat on the floor. Tighten your belly muscles, and then push with your feet and raise your buttocks up a few inches. Hold this position 6 seconds as you continue to breathe normally, then lower yourself slowly to the floor. Repeat 8 to 12 times. ¨ If you like group exercise, try Pilates or yoga. These classes have poses that strengthen the core muscles. Lead a healthy lifestyle · Stay at a healthy weight to avoid strain on your back. · Do not smoke. Smoking increases the risk of osteoporosis, which weakens the spine. If you need help quitting, talk to your doctor about stop-smoking programs and medicines. These can increase your chances of quitting for good. Where can you learn more? Go to http://melvinablabfeedjase.info/. Enter L315 in the search box to learn more about \"Learning About How to Have a Healthy Back. \" Current as of: November 29, 2017 Content Version: 11.7 © 0977-2982 [employer]. Care instructions adapted under license by [employer] (which disclaims liability or warranty for this information). If you have questions about a medical condition or this instruction, always ask your healthcare professional. [address] any warranty or liability for your use of this information. Low Back Pain: Exercises Your Care Instructions Here are some examples of typical rehabilitation exercises for your condition. Start each exercise slowly. Ease off the exercise if you start to have pain. Your doctor or physical therapist will tell you when you can start these exercises and which ones will work best for you. How to do the exercises Press-up 1. Lie on your stomach, supporting your body with your forearms. 2. Press your elbows down into the floor to raise your upper back.  As you do this, relax your stomach muscles and allow your back to arch without using your back muscles. As your press up, do not let your hips or pelvis come off the floor. 3. Hold for 15 to 30 seconds, then relax. 4. Repeat 2 to 4 times. Alternate arm and leg (bird dog) exercise Do this exercise slowly. Try to keep your body straight at all times, and do not let one hip drop lower than the other. 1. Start on the floor, on your hands and knees. 2. Tighten your belly muscles. 3. Raise one leg off the floor, and hold it straight out behind you. Be careful not to let your hip drop down, because that will twist your trunk. 4. Hold for about 6 seconds, then lower your leg and switch to the other leg. 5. Repeat 8 to 12 times on each leg. 6. Over time, work up to holding for 10 to 30 seconds each time. 7. If you feel stable and secure with your leg raised, try raising the opposite arm straight out in front of you at the same time. Knee-to-chest exercise 1. Lie on your back with your knees bent and your feet flat on the floor. 2. Bring one knee to your chest, keeping the other foot flat on the floor (or keeping the other leg straight, whichever feels better on your lower back). 3. Keep your lower back pressed to the floor. Hold for at least 15 to 30 seconds. 4. Relax, and lower the knee to the starting position. 5. Repeat with the other leg. Repeat 2 to 4 times with each leg. 6. To get more stretch, put your other leg flat on the floor while pulling your knee to your chest. 
Curl-ups 1. Lie on the floor on your back with your knees bent at a 90-degree angle. Your feet should be flat on the floor, about 12 inches from your buttocks. 2. Cross your arms over your chest. If this bothers your neck, try putting your hands behind your neck (not your head), with your elbows spread apart. 3. Slowly tighten your belly muscles and raise your shoulder blades off the floor. 4. Keep your head in line with your body, and do not press your chin to your chest. 
5. Hold this position for 1 or 2 seconds, then slowly lower yourself back down to the floor. 6. Repeat 8 to 12 times. Pelvic tilt exercise 1. Lie on your back with your knees bent. 2. \"Brace\" your stomach. This means to tighten your muscles by pulling in and imagining your belly button moving toward your spine. You should feel like your back is pressing to the floor and your hips and pelvis are rocking back. 3. Hold for about 6 seconds while you breathe smoothly. 4. Repeat 8 to 12 times. Heel dig bridging 1. Lie on your back with both knees bent and your ankles bent so that only your heels are digging into the floor. Your knees should be bent about 90 degrees. 2. Then push your heels into the floor, squeeze your buttocks, and lift your hips off the floor until your shoulders, hips, and knees are all in a straight line. 3. Hold for about 6 seconds as you continue to breathe normally, and then slowly lower your hips back down to the floor and rest for up to 10 seconds. 4. Do 8 to 12 repetitions. Hamstring stretch in doorway 1. Lie on your back in a doorway, with one leg through the open door. 2. Slide your leg up the wall to straighten your knee. You should feel a gentle stretch down the back of your leg. 3. Hold the stretch for at least 15 to 30 seconds. Do not arch your back, point your toes, or bend either knee. Keep one heel touching the floor and the other heel touching the wall. 4. Repeat with your other leg. 5. Do 2 to 4 times for each leg. Hip flexor stretch 1. Kneel on the floor with one knee bent and one leg behind you. Place your forward knee over your foot. Keep your other knee touching the floor. 2. Slowly push your hips forward until you feel a stretch in the upper thigh of your rear leg. 3. Hold the stretch for at least 15 to 30 seconds. Repeat with your other leg. 4. Do 2 to 4 times on each side. Wall sit 1. Stand with your back 10 to 12 inches away from a wall. 2. Lean into the wall until your back is flat against it. 3. Slowly slide down until your knees are slightly bent, pressing your lower back into the wall. 4. Hold for about 6 seconds, then slide back up the wall. 5. Repeat 8 to 12 times. Follow-up care is a key part of your treatment and safety. Be sure to make and go to all appointments, and call your doctor if you are having problems. It's also a good idea to know your test results and keep a list of the medicines you take. Where can you learn more? Go to http://melvina-jase.info/. Enter O420 in the search box to learn more about \"Low Back Pain: Exercises. \" Current as of: November 29, 2017 Content Version: 11.7 © 3282-8074 [employer]. Care instructions adapted under license by [employer] (which disclaims liability or warranty for this information). If you have questions about a medical condition or this instruction, always ask your healthcare professional. [address] any warranty or liability for your use of this information. Learning About RICE (Rest, Ice, Compression, and Elevation) What is RICE? RICE is a way to care for an injury. RICE helps relieve pain and swelling. It may also help with healing and flexibility. RICE stands for: · Rest and protect the injured or sore area. · Ice or a cold pack used as soon as possible. · Compression, or wrapping the injured or sore area with an elastic bandage. · Elevation (propping up) the injured or sore area. How do you do RICE? You can use RICE for home treatment when you have general aches and pains or after an injury or surgery. Rest 
· Do not put weight on the injury for at least 24 to 48 hours. · Use crutches for a badly sprained knee or ankle. · Support a sprained wrist, elbow, or shoulder with a sling.  
Ice 
 · Put ice or a cold pack on the injury right away to reduce pain and swelling. Frozen vegetables will also work as an ice pack. Put a thin cloth between the ice or cold pack and your skin. The cloth protects the injured area from getting too cold. · Use ice for 10 to 15 minutes at a time for the first 48 to 72 hours. Compression · Use compression for sprains, strains, and surgeries of the arms and legs. · Wrap the injured area with an elastic bandage or compression sleeve to reduce swelling. · Don't wrap it too tightly. If the area below it feels numb, tingles, or feels cool, loosen the wrap. Elevation · Use elevation for areas of the body that can be propped up, such as arms and legs. · Prop up the injured area on pillows whenever you use ice. Keep it propped up anytime you sit or lie down. · Try to keep the injured area at or above the level of your heart. This will help reduce swelling and bruising. Where can you learn more? Go to http://melvina-jase.info/. Enter C665 in the search box to learn more about \"Learning About RICE (Rest, Ice, Compression, and Elevation). \" 
Current as of: November 29, 2017 Content Version: 11.7 © 3536-6475 [employer]. Care instructions adapted under license by [employer] (which disclaims liability or warranty for this information). If you have questions about a medical condition or this instruction, always ask your healthcare professional. [place] [address] any warranty or liability for your use of this information. Introducing \[facility]\"" & HEALTH SERVICES! Seth Vargas introduces [employer] patient portal. Now you can access parts of your medical record, email your doctor's office, and request medication refills online. 1. In your internet browser, go to [email or web address] 2. Click on the First Time User? Click Here link in the Sign In box. You will see the New Member Sign Up page. 3. Enter your [employer] Access Code exactly as it appears below. You will not need to use this code after youve completed the sign-up process. If you do not sign up before the expiration date, you must request a new code. · [employer] Access Code: [email or web address] Expires: 9/4/2018  9:33 AM 
 
4. Enter the last four digits of your Social Security Number (xxxx) and Date of Birth (mm/dd/yyyy) as indicated and click Submit. You will be taken to the next sign-up page. 5. Create a [employer] L[employer] ID. This will be your [employer] login ID and cannot be changed, so think of one that is secure and easy to remember. 6. Create a [employer] password. You can change your password at any time. 7. Enter your Password Reset Question and Answer. This can be used at a later time if you forget your password. 8. Enter your e-mail address. You will receive e-mail notification when new information is available in [address] 9. Click Sign Up. You can now view and download portions of your medical record. 10. Click the Download Summary menu link to download a portable copy of your medical information. If you have questions, please visit the Frequently Asked Questions section of the [employer] website. Remember, [employer] is NOT to be used for urgent needs. For medical emergencies, dial 911. Now available from your iPhone and Android! Please provide this summary of care documentation to your next provider. Your primary care clinician is listed as RODRIGO Graham. If you have any questions after today's visit, please call [phone].

## 2018-11-21 ENCOUNTER — TELEPHONE (OUTPATIENT)
Dept: INTERNAL MEDICINE CLINIC | Age: 28
End: 2018-11-21

## 2018-11-21 NOTE — TELEPHONE ENCOUNTER
Pt is requesting paperwork be scanned and emailed to [email or web address] regarding MRI/CT scans.  Best contact

## 2018-12-19 ENCOUNTER — OFFICE VISIT (OUTPATIENT)
Dept: INTERNAL MEDICINE CLINIC | Age: 28
End: 2018-12-19

## 2018-12-19 VITALS
TEMPERATURE: 98.5 F | RESPIRATION RATE: 17 BRPM | WEIGHT: 208 LBS | DIASTOLIC BLOOD PRESSURE: 64 MMHG | HEIGHT: 63 IN | OXYGEN SATURATION: 98 % | BODY MASS INDEX: 36.86 KG/M2 | SYSTOLIC BLOOD PRESSURE: 111 MMHG | HEART RATE: 70 BPM

## 2018-12-19 DIAGNOSIS — Z13.220 SCREENING FOR LIPID DISORDERS: ICD-10-CM

## 2018-12-19 DIAGNOSIS — Z13.29 SCREENING FOR ENDOCRINE, NUTRITIONAL, METABOLIC AND IMMUNITY DISORDER: ICD-10-CM

## 2018-12-19 DIAGNOSIS — M54.50 CHRONIC MIDLINE LOW BACK PAIN WITHOUT SCIATICA: ICD-10-CM

## 2018-12-19 DIAGNOSIS — Z23 ENCOUNTER FOR IMMUNIZATION: ICD-10-CM

## 2018-12-19 DIAGNOSIS — J30.9 ALLERGIC RHINITIS, UNSPECIFIED SEASONALITY, UNSPECIFIED TRIGGER: ICD-10-CM

## 2018-12-19 DIAGNOSIS — R35.1 NOCTURIA: ICD-10-CM

## 2018-12-19 DIAGNOSIS — Z00.00 ADULT GENERAL MEDICAL EXAMINATION: Primary | ICD-10-CM

## 2018-12-19 DIAGNOSIS — G89.29 CHRONIC MIDLINE LOW BACK PAIN WITHOUT SCIATICA: ICD-10-CM

## 2018-12-19 DIAGNOSIS — E66.01 SEVERE OBESITY (HCC): ICD-10-CM

## 2018-12-19 DIAGNOSIS — Z13.228 SCREENING FOR ENDOCRINE, NUTRITIONAL, METABOLIC AND IMMUNITY DISORDER: ICD-10-CM

## 2018-12-19 DIAGNOSIS — Z11.1 SCREENING EXAMINATION FOR PULMONARY TUBERCULOSIS: ICD-10-CM

## 2018-12-19 DIAGNOSIS — Z11.3 SCREENING EXAMINATION FOR STD (SEXUALLY TRANSMITTED DISEASE): ICD-10-CM

## 2018-12-19 DIAGNOSIS — Z13.21 SCREENING FOR ENDOCRINE, NUTRITIONAL, METABOLIC AND IMMUNITY DISORDER: ICD-10-CM

## 2018-12-19 DIAGNOSIS — S69.92XA INJURY OF LEFT WRIST, INITIAL ENCOUNTER: ICD-10-CM

## 2018-12-19 DIAGNOSIS — Z71.89 ADVANCE CARE PLANNING: ICD-10-CM

## 2018-12-19 DIAGNOSIS — Z11.4 SCREENING FOR HIV WITHOUT PRESENCE OF RISK FACTORS: ICD-10-CM

## 2018-12-19 DIAGNOSIS — Z13.0 SCREENING FOR ENDOCRINE, NUTRITIONAL, METABOLIC AND IMMUNITY DISORDER: ICD-10-CM

## 2018-12-19 DIAGNOSIS — R51.9 FREQUENT HEADACHES: ICD-10-CM

## 2018-12-19 LAB
BILIRUB UR QL STRIP: NEGATIVE
GLUCOSE UR-MCNC: NEGATIVE MG/DL
HCG URINE, QL. (POC): NEGATIVE
KETONES P FAST UR STRIP-MCNC: NEGATIVE MG/DL
PH UR STRIP: 5.5 [PH] (ref 4.6–8)
PROT UR QL STRIP: NEGATIVE
SP GR UR STRIP: 1.02 (ref 1–1.03)
UA UROBILINOGEN AMB POC: NORMAL (ref 0.2–1)
URINALYSIS CLARITY POC: CLEAR
URINALYSIS COLOR POC: YELLOW
URINE BLOOD POC: NEGATIVE
URINE LEUKOCYTES POC: NEGATIVE
URINE NITRITES POC: NEGATIVE
VALID INTERNAL CONTROL?: YES

## 2018-12-19 RX ORDER — SUMATRIPTAN 100 MG/1
100 TABLET, FILM COATED ORAL
Qty: 30 TAB | Refills: 1 | Status: SHIPPED | OUTPATIENT
Start: 2018-12-19 | End: 2018-12-19

## 2018-12-19 RX ORDER — FLUTICASONE PROPIONATE 50 MCG
2 SPRAY, SUSPENSION (ML) NASAL DAILY
Qty: 1 BOTTLE | Refills: 5 | Status: SHIPPED | OUTPATIENT
Start: 2018-12-19 | End: 2019-10-03 | Stop reason: ALTCHOICE

## 2018-12-19 RX ORDER — MELOXICAM 15 MG/1
15 TABLET ORAL DAILY
Qty: 90 TAB | Refills: 3 | Status: SHIPPED | OUTPATIENT
Start: 2018-12-19 | End: 2019-10-03 | Stop reason: ALTCHOICE

## 2018-12-19 RX ORDER — PROPRANOLOL HYDROCHLORIDE 60 MG/1
60 TABLET ORAL 3 TIMES DAILY
Qty: 90 TAB | Refills: 2 | Status: SHIPPED | OUTPATIENT
Start: 2018-12-19 | End: 2019-10-03 | Stop reason: ALTCHOICE
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
## 2018-12-19 NOTE — PROGRESS NOTES
Pt is here for   Chief Complaint   Patient presents with    Immunization/Injection     PPD, Flu Shot    Complete Physical         Pt states pain level is a 0/10    1. Have you been to the ER, urgent care clinic since your last visit? Hospitalized since your last visit? No    2. Have you seen or consulted any other health care providers outside of the [address] [place] [address] since your last visit? Include any pap smears or colon screening. Jessica Serra is a 33 y.o. female who presents for routine immunizations. She denies any symptoms , reactions or allergies that would exclude them from being immunized today. Risks and adverse reactions were discussed and the VIS was given to them. All questions were addressed. She was observed for 15 min post injection. There were no reactions observed. SUSI Sandoval for PPD Placement, Immunization and POCT given per Irena Sever, FNP-C

## 2018-12-19 NOTE — PATIENT INSTRUCTIONS
Also look up THE FAST METABOLISM DIET on Pinterest. Diet is by [facility]. May also try Benadryl, Phenylephrine, or Chlor-Trimetron for symptoms while using nasal spray and Zyrtec. Try using the nasal spray holding it with your opposing hand  for the nostril you are spraying (left hand, right nostril) and aim it at the ear on that side. Hold the bottle parallel to the floor then spray to see if this helps. Vaccine Information Statement    Influenza (Flu) Vaccine (Inactivated or Recombinant): What you need to know    Many Vaccine Information Statements are available in Citizen of [place] and other languages. See www.immunize.org/vis  Hojas de Información Sobre Vacunas están disponibles en Español y en muchos otros idiomas. Visite www.immunize.org/vis    1. Why get vaccinated? Influenza (flu) is a contagious disease that spreads around the [facility] every year, usually between October and May. Flu is caused by influenza viruses, and is spread mainly by coughing, sneezing, and close contact. Anyone can get flu. Flu strikes suddenly and can last several days. Symptoms vary by age, but can include:   fever/chills   sore throat   muscle aches   fatigue   cough   headache    runny or stuffy nose    Flu can also lead to pneumonia and blood infections, and cause diarrhea and seizures in children. If you have a medical condition, such as heart or lung disease, flu can make it worse. Flu is more dangerous for some people. Infants and young children, people 72years of age and older, pregnant women, and people with certain health conditions or a weakened immune system are at greatest risk. Each year thousands of people in the [facility] die from flu, and many more are hospitalized. Flu vaccine can:   keep you from getting flu,   make flu less severe if you do get it, and   keep you from spreading flu to your family and other people.      2. Inactivated and recombinant flu vaccines    A dose of flu vaccine is recommended every flu season. Children 6 months through 6years of age may need two doses during the same flu season. Everyone else needs only one dose each flu season. Some inactivated flu vaccines contain a very small amount of a mercury-based preservative called thimerosal. Studies have not shown thimerosal in vaccines to be harmful, but flu vaccines that do not contain thimerosal are available. There is no live flu virus in flu shots. They cannot cause the flu. There are many flu viruses, and they are always changing. Each year a new flu vaccine is made to protect against three or four viruses that are likely to cause disease in the upcoming flu season. But even when the vaccine doesnt exactly match these viruses, it may still provide some protection    Flu vaccine cannot prevent:   flu that is caused by a virus not covered by the vaccine, or   illnesses that look like flu but are not. It takes about 2 weeks for protection to develop after vaccination, and protection lasts through the flu season. 3. Some people should not get this vaccine    Tell the person who is giving you the vaccine:     If you have any severe, life-threatening allergies. If you ever had a life-threatening allergic reaction after a dose of flu vaccine, or have a severe allergy to any part of this vaccine, you may be advised not to get vaccinated. Most, but not all, types of flu vaccine contain a small amount of egg protein.  If you ever had Guillain-Barré Syndrome (also called GBS). Some people with a history of GBS should not get this vaccine. This should be discussed with your doctor.  If you are not feeling well. It is usually okay to get flu vaccine when you have a mild illness, but you might be asked to come back when you feel better. 4. Risks of a vaccine reaction    With any medicine, including vaccines, there is a chance of reactions.  These are usually mild and go away on their own, but serious reactions are also possible. Most people who get a flu shot do not have any problems with it. Minor problems following a flu shot include:    soreness, redness, or swelling where the shot was given     hoarseness   sore, red or itchy eyes   cough   fever   aches   headache   itching   fatigue  If these problems occur, they usually begin soon after the shot and last 1 or 2 days. More serious problems following a flu shot can include the following:     There may be a small increased risk of Guillain-Barré Syndrome (GBS) after inactivated flu vaccine. This risk has been estimated at 1 or 2 additional cases per million people vaccinated. This is much lower than the risk of severe complications from flu, which can be prevented by flu vaccine.  Young children who get the flu shot along with pneumococcal vaccine (PCV13) and/or DTaP vaccine at the same time might be slightly more likely to have a seizure caused by fever. Ask your doctor for more information. Tell your doctor if a child who is getting flu vaccine has ever had a seizure. Problems that could happen after any injected vaccine:      People sometimes faint after a medical procedure, including vaccination. Sitting or lying down for about 15 minutes can help prevent fainting, and injuries caused by a fall. Tell your doctor if you feel dizzy, or have vision changes or ringing in the ears.  Some people get severe pain in the shoulder and have difficulty moving the arm where a shot was given. This happens very rarely.  Any medication can cause a severe allergic reaction. Such reactions from a vaccine are very rare, estimated at about 1 in a million doses, and would happen within a few minutes to a few hours after the vaccination. As with any medicine, there is a very remote chance of a vaccine causing a serious injury or death.     The safety of vaccines is always being monitored. For more information, visit: www.cdc.gov/vaccinesafety/    5. What if there is a serious reaction? What should I look for?  Look for anything that concerns you, such as signs of a severe allergic reaction, very high fever, or unusual behavior. Signs of a severe allergic reaction can include hives, swelling of the face and throat, difficulty breathing, a fast heartbeat, dizziness, and weakness  usually within a few minutes to a few hours after the vaccination. What should I do?  If you think it is a severe allergic reaction or other emergency that cant wait, call 9-1-1 and get the person to the nearest hospital. Otherwise, call your doctor.  Reactions should be reported to the Vaccine Adverse Event Reporting System (VAERS). Your doctor should file this report, or you can do it yourself through  the VAERS web site at www.vaers. hhs.gov, or by calling 5-773.922.3821. VAERS does not give medical advice. 6. The National Vaccine Injury Compensation Program    The [facility] Vaccine Injury Compensation Program (VICP) is a federal program that was created to compensate people who may have been injured by certain vaccines. Persons who believe they may have been injured by a vaccine can learn about the program and about filing a claim by calling 6-435.481.8489 or visiting the [facility] website at www.[facility].gov/vaccinecompensation. There is a time limit to file a claim for compensation. 7. How can I learn more?  Ask your healthcare provider. He or she can give you the vaccine package insert or suggest other sources of information.  Call your local or state health department.  Contact the Centers for Disease Control and Prevention (CDC):  - Call 9-871.289.6152 (1-800-CDC-INFO) or  - Visit CDCs website at www.cdc.gov/flu    Vaccine Information Statement   Inactivated Influenza Vaccine   8/7/2015  42 JUAN FRANCISCO Pandey [id number]    [facility] for Disease Control and Prevention    Office Use Only

## 2018-12-20 NOTE — ACP (ADVANCE CARE PLANNING)
Advanced care planning- discussed Advance directive, Medical POA, and life sustaining options. Given/Mailing wally hollins paper work and contact info for [facility] facilitator/NN to complete paperwork in office. Advance Care Planning (ACP) Provider Conversation Snapshot    Date of ACP Conversation: 12/19/18  Persons included in Conversation:  Patient/family  Length of ACP Conversation in minutes:  5-10 minutes    Authorized Decision Maker (if patient is incapable of making informed decisions): This person is:   Healthcare Agent/Medical Power of  under Advance Directive            For Patients with Decision Making Capacity:   Values/Goals: Exploration of values, goals, and preferences if recovery is not expected, even with continued medical treatment in the event of:  Severe, permanent brain injury  \"In these circumstances, what matters most to you? \"  Care focused more on comfort or quality of life.     Conversation Outcomes / Follow-Up Plan:   Recommended completion of Advance Directive form after review of ACP materials and conversation with prospective healthcare agent   Referral made for ACP follow-up assistance to:  [facility] facilitator/ Nurse navigator

## 2018-12-21 ENCOUNTER — CLINICAL SUPPORT (OUTPATIENT)
Dept: INTERNAL MEDICINE CLINIC | Age: 28
End: 2018-12-21

## 2018-12-21 DIAGNOSIS — Z11.1 ENCOUNTER FOR PPD SKIN TEST READING: Primary | ICD-10-CM

## 2018-12-21 LAB
MM INDURATION POC: 0 MM (ref 0–5)
PPD POC: NEGATIVE NEGATIVE

## 2018-12-21 NOTE — PROGRESS NOTES
Tuberculin skin test applied to left ventral forearm. Explained how to read the test, measuring induration not just erythema; she will come into office if test appears positive. Red    PPD Reading Note  PPD read and results entered in [facility]. Result: 0 mm induration.   Interpretation: negative  If test not read within 48-72 hours of initial placement, patient advised to repeat in other arm 1-3 weeks after this test.  Allergic reaction: No

## 2018-12-22 LAB
BUN SERPL-MCNC: 7 MG/DL (ref 6–20)
BUN/CREAT SERPL: 9 (ref 9–23)
C TRACH RRNA SPEC QL NAA+PROBE: NEGATIVE
CALCIUM SERPL-MCNC: 9.2 MG/DL (ref 8.7–10.2)
CHLORIDE SERPL-SCNC: 103 MMOL/L (ref 96–106)
CHOLEST SERPL-MCNC: 187 MG/DL (ref 100–199)
CO2 SERPL-SCNC: 22 MMOL/L (ref 20–29)
CREAT SERPL-MCNC: 0.77 MG/DL (ref 0.57–1)
ERYTHROCYTE [DISTWIDTH] IN BLOOD BY AUTOMATED COUNT: 15 % (ref 12.3–15.4)
EST. AVERAGE GLUCOSE BLD GHB EST-MCNC: 111 MG/DL
GLUCOSE SERPL-MCNC: 124 MG/DL (ref 65–99)
HBA1C MFR BLD: 5.5 % (ref 4.8–5.6)
HCT VFR BLD AUTO: 36.4 % (ref 34–46.6)
HDLC SERPL-MCNC: 60 MG/DL
HGB BLD-MCNC: 11.9 G/DL (ref 11.1–15.9)
HIV 1+2 AB+HIV1 P24 AG SERPL QL IA: NON REACTIVE
INTERPRETATION, 910389: NORMAL
LDLC SERPL CALC-MCNC: 115 MG/DL (ref 0–99)
MCH RBC QN AUTO: 26.8 PG (ref 26.6–33)
MCHC RBC AUTO-ENTMCNC: 32.7 G/DL (ref 31.5–35.7)
MCV RBC AUTO: 82 FL (ref 79–97)
N GONORRHOEA RRNA SPEC QL NAA+PROBE: NEGATIVE
PLATELET # BLD AUTO: 371 X10E3/UL (ref 150–379)
POTASSIUM SERPL-SCNC: 4.4 MMOL/L (ref 3.5–5.2)
RBC # BLD AUTO: 4.44 X10E6/UL (ref 3.77–5.28)
SODIUM SERPL-SCNC: 139 MMOL/L (ref 134–144)
T VAGINALIS RRNA VAG QL NAA+PROBE: NEGATIVE
TRIGL SERPL-MCNC: 61 MG/DL (ref 0–149)
TSH SERPL DL<=0.005 MIU/L-ACNC: 1.06 UIU/ML (ref 0.45–4.5)
VLDLC SERPL CALC-MCNC: 12 MG/DL (ref 5–40)
WBC # BLD AUTO: 5.9 X10E3/UL (ref 3.4–10.8)

## 2018-12-27 LAB
C TRACH RRNA SPEC QL NAA+PROBE: NEGATIVE
N GONORRHOEA RRNA SPEC QL NAA+PROBE: NEGATIVE
T VAGINALIS RRNA VAG QL NAA+PROBE: NEGATIVE

## 2019-10-03 ENCOUNTER — OFFICE VISIT (OUTPATIENT)
Dept: INTERNAL MEDICINE CLINIC | Age: 29
End: 2019-10-03

## 2019-10-03 VITALS
HEART RATE: 91 BPM | BODY MASS INDEX: 34.02 KG/M2 | SYSTOLIC BLOOD PRESSURE: 121 MMHG | HEIGHT: 63 IN | RESPIRATION RATE: 18 BRPM | DIASTOLIC BLOOD PRESSURE: 77 MMHG | WEIGHT: 192 LBS | TEMPERATURE: 98.5 F | OXYGEN SATURATION: 100 %

## 2019-10-03 DIAGNOSIS — M79.642 LEFT HAND PAIN: ICD-10-CM

## 2019-10-03 DIAGNOSIS — S61.219A LACERATION OF FINGER OF LEFT HAND WITH COMPLICATION, INITIAL ENCOUNTER: Primary | ICD-10-CM

## 2019-10-03 RX ORDER — IBUPROFEN 800 MG/1
800 TABLET ORAL
Qty: 20 TAB | Refills: 0 | Status: SHIPPED | OUTPATIENT
Start: 2019-10-03 | End: 2020-02-18 | Stop reason: ALTCHOICE
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
## 2019-10-03 NOTE — PATIENT INSTRUCTIONS
Cuts on the Hand Closed With Stitches: Care Instructions Your Care Instructions A cut on your hand can be on your fingers, your thumb, or the front or back of your hand. Sometimes a cut can injure the tendons, blood vessels, or nerves of your hand. The doctor used stitches to close the cut. Using stitches also helps the cut heal and reduces scarring. The doctor may have given you a splint to help prevent you from moving your hand, fingers, or thumb. If the cut went deep and through the skin, the doctor put in two layers of stitches. The deeper layer brings the deep part of the cut together. These stitches will dissolve and don't need to be removed. The stitches in the upper layer are the ones you see on the cut. You will probably have a bandage. You will need to have the stitches removed, usually in 7 to 14 days. The doctor may suggest that you see a hand specialist if the cut is very deep or if you have trouble moving your fingers or have less feeling in your hand. The doctor has checked you carefully, but problems can develop later. If you notice any problems or new symptoms, get medical treatment right away. Follow-up care is a key part of your treatment and safety. Be sure to make and go to all appointments, and call your doctor if you are having problems. It's also a good idea to know your test results and keep a list of the medicines you take. How can you care for yourself at home? · Keep the cut dry for the first 24 to 48 hours. After this, you can shower if your doctor okays it. Pat the cut dry. · Don't soak the cut, such as in a bathtub. Your doctor will tell you when it's safe to get the cut wet. · If your doctor told you how to care for your cut, follow your doctor's instructions. If you did not get instructions, follow this general advice: ? After the first 24 to 48 hours, wash around the cut with clean water 2 times a day.  Don't use hydrogen peroxide or alcohol, which can slow healing. ? You may cover the cut with a thin layer of petroleum jelly, such as Vaseline, and a nonstick bandage. ? Apply more petroleum jelly and replace the bandage as needed. · Prop up the sore hand on a pillow anytime you sit or lie down during the next 3 days. Try to keep it above the level of your heart. This will help reduce swelling. · Avoid any activity that could cause your cut to reopen. · Do not remove the stitches on your own. Your doctor will tell you when to come back to have the stitches removed. · Be safe with medicines. Take pain medicines exactly as directed. ? If the doctor gave you a prescription medicine for pain, take it as prescribed. ? If you are not taking a prescription pain medicine, ask your doctor if you can take an over-the-counter medicine. When should you call for help? Call your doctor now or seek immediate medical care if: 
  · You have new pain, or your pain gets worse.  
  · The skin near the cut is cold or pale or changes color.  
  · You have tingling, weakness, or numbness near the cut.  
  · The cut starts to bleed, and blood soaks through the bandage. Oozing small amounts of blood is normal.  
  · You have trouble moving the area of the hand near the cut.  
  · You have symptoms of infection, such as: 
? Increased pain, swelling, warmth, or redness around the cut. 
? Red streaks leading from the cut. 
? Pus draining from the cut. 
? A fever.  
 Watch closely for changes in your health, and be sure to contact your doctor if: 
  · You do not get better as expected. Where can you learn more? Go to http://melvina-jase.info/. Enter T250 in the search box to learn more about \"Cuts on the Hand Closed With Stitches: Care Instructions. \" Current as of: June 26, 2019 Content Version: 12.2 © 0710-5645 [employer].  Care instructions adapted under license by [facility] (which disclaims liability or warranty for this information). If you have questions about a medical condition or this instruction, always ask your healthcare professional. [place] [address] any warranty or liability for your use of this information.

## 2020-04-30 ENCOUNTER — TELEPHONE (OUTPATIENT)
Dept: OBGYN CLINIC | Age: 30
End: 2020-04-30

## 2020-04-30 ENCOUNTER — OFFICE VISIT (OUTPATIENT)
Dept: OBGYN CLINIC | Age: 30
End: 2020-04-30

## 2020-04-30 VITALS
SYSTOLIC BLOOD PRESSURE: 128 MMHG | DIASTOLIC BLOOD PRESSURE: 84 MMHG | BODY MASS INDEX: 32.45 KG/M2 | WEIGHT: 183.13 LBS | HEIGHT: 63 IN

## 2020-04-30 DIAGNOSIS — Z34.81 PRENATAL CARE, SUBSEQUENT PREGNANCY IN FIRST TRIMESTER: Primary | ICD-10-CM

## 2020-04-30 LAB
ANTIBODY SCREEN, EXTERNAL: NEGATIVE
HBSAG, EXTERNAL: NEGATIVE
HCT, EXTERNAL: 34.3
HGB EVAL, EXTERNAL: NEGATIVE
HGB, EXTERNAL: 11.7
HIV, EXTERNAL: NON REACTIVE
PLATELET CNT,   EXTERNAL: 382
RUBELLA, EXTERNAL: NORMAL
T. PALLIDUM, EXTERNAL: NON REACTIVE
TYPE, ABO & RH, EXTERNAL: NORMAL
VARICELLA, EXTERNAL: NORMAL

## 2020-04-30 RX ORDER — ONDANSETRON 4 MG/1
4 TABLET, ORALLY DISINTEGRATING ORAL
Qty: 20 TAB | Refills: 3 | Status: SHIPPED | OUTPATIENT
Start: 2020-04-30 | End: 2020-08-27

## 2020-04-30 RX ORDER — PROMETHAZINE HYDROCHLORIDE 12.5 MG/1
12.5 TABLET ORAL
Qty: 20 TAB | Refills: 1 | Status: SHIPPED | OUTPATIENT
Start: 2020-04-30 | End: 2020-08-27

## 2020-04-30 RX ORDER — SWAB
1 SWAB, NON-MEDICATED MISCELLANEOUS DAILY
Qty: 30 TAB | Refills: 11 | Status: SHIPPED | OUTPATIENT
Start: 2020-04-30 | End: 2021-04-30
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
## 2020-04-30 NOTE — PATIENT INSTRUCTIONS
Weeks 10 to 14 of Your Pregnancy: Care Instructions Your Care Instructions By weeks 10 to 15 of your pregnancy, the placenta has formed inside your uterus. It is possible to hear your baby's heartbeat with a special ultrasound device. Your baby's eyes can and do move. The arms and legs can bend. This is a good time to think about testing for birth defects. There are two types of tests: screening and diagnostic. Screening tests show the chance that a baby has a certain birth defect. They can't tell you for sure that your baby has a problem. Diagnostic tests show if a baby has a certain birth defect. It's your choice whether to have these tests. You and your partner can talk to your doctor or midwife about birth defects tests. Follow-up care is a key part of your treatment and safety. Be sure to make and go to all appointments, and call your doctor if you are having problems. It's also a good idea to know your test results and keep a list of the medicines you take. How can you care for yourself at home? Decide about tests · You can have screening tests and diagnostic tests to check for birth defects. The decision to have a test for birth defects is personal. Think about your age, your chance of passing on a family disease, your need to know about any problems, and what you might do after you have the test results. ? Triple or quadruple (quad) blood tests. These screening tests can be done between 15 and 20 weeks of pregnancy. They check the amounts of three or four substances in your blood. The doctor looks at these test results, along with your age and other factors, to find out the chance that your baby may have certain problems. ? Amniocentesis. This diagnostic test is used to look for chromosomal problems in the baby's cells. It can be done between 15 and 20 weeks of pregnancy, usually around week 16. ? Nuchal translucency test. This test uses ultrasound to measure the thickness of the area at the back of the baby's neck. An increase in the thickness can be an early sign of Down syndrome. ? Chorionic villus sampling (CVS). This is a test that looks for certain genetic problems with your baby. The same genes that are in your baby are in the placenta. A small piece of the placenta is taken out and tested. This test is done when you are 10 to 13 weeks pregnant. Ease discomfort · Slow down and take naps when you feel tired. · If your emotions swing, talk to someone. Crying, anxiety, and concentration problems are common. · If your gums bleed, try a softer toothbrush. If your gums are puffy and bleed a lot, see your dentist. 
· If you feel dizzy: ? Get up slowly after sitting or lying down. ? Drink plenty of fluids. ? Eat small snacks to keep your blood sugar stable. ? Put your head between your legs as though you were tying your shoelaces. ? Lie down with your legs higher than your head. Use pillows to prop up your feet. · If you have a headache: ? Lie down. ? Ask your partner or a good friend for a neck massage. ? Try cool cloths over your forehead or across the back of your neck. ? Use acetaminophen (Tylenol) for pain relief. Do not use nonsteroidal anti-inflammatory drugs (NSAIDs), such as ibuprofen (Advil, Motrin) or naproxen (Aleve), unless your doctor says it is okay. · If you have a nosebleed, pinch your nose gently, and hold it for a short while. To prevent nosebleeds, try massaging a small dab of petroleum jelly, such as Vaseline, in your nostrils. · If your nose is stuffed up, try saline (saltwater) nose sprays. Do not use decongestant sprays. Care for your breasts · Wear a bra that gives you good support. · Know that changes in your breasts are normal. 
? Your breasts may get larger and more tender. Tenderness usually gets better by 12 weeks. ? Your nipples may get darker and larger, and small bumps around your nipples may show more. ? The veins in your chest and breasts may show more. · Don't worry about \"toughening'\" your nipples. Breastfeeding will naturally do this. Where can you learn more? Go to http://melvina-jase.info/ Enter Q955 in the search box to learn more about \"Weeks 10 to 14 of Your Pregnancy: Care Instructions. \" Current as of: May 29, 2019Content Version: 12.4 © 6727-1829 [employer], [employer]orporated. Care instructions adapted under license by [employer] (which disclaims liability or warranty for this information). If you have questions about a medical condition or this instruction, always ask your healthcare professional. [address] any warranty or liability for your use of this information.

## 2020-05-04 LAB
ABO GROUP BLD: NORMAL
BLD GP AB SCN SERPL QL: NEGATIVE
ERYTHROCYTE [DISTWIDTH] IN BLOOD BY AUTOMATED COUNT: 13.8 % (ref 11.7–15.4)
HBV SURFACE AG SERPL QL IA: NEGATIVE
HCT VFR BLD AUTO: 34.3 % (ref 34–46.6)
HGB A MFR BLD: 97.6 % (ref 96.4–98.8)
HGB A2 MFR BLD COLUMN CHROM: 2.4 % (ref 1.8–3.2)
HGB BLD-MCNC: 11.7 G/DL (ref 11.1–15.9)
HGB C MFR BLD: 0 %
HGB F MFR BLD: 0 % (ref 0–2)
HGB FRACT BLD-IMP: NORMAL
HGB OTHER MFR BLD HPLC: 0 %
HGB S BLD QL SOLY: NEGATIVE
HGB S MFR BLD: 0 %
HIV 1+2 AB+HIV1 P24 AG SERPL QL IA: NON REACTIVE
MCH RBC QN AUTO: 29 PG (ref 26.6–33)
MCHC RBC AUTO-ENTMCNC: 34.1 G/DL (ref 31.5–35.7)
MCV RBC AUTO: 85 FL (ref 79–97)
PLATELET # BLD AUTO: 382 X10E3/UL (ref 150–450)
RBC # BLD AUTO: 4.03 X10E6/UL (ref 3.77–5.28)
RH BLD: POSITIVE
RUBV IGG SERPL IA-ACNC: 4.09 INDEX
TREPONEMA PALLIDUM IGG+IGM AB [PRESENCE] IN SERUM OR PLASMA BY IMMUNOASSAY: NON REACTIVE
VZV IGG SER IA-ACNC: 1136 INDEX
WBC # BLD AUTO: 5.2 X10E3/UL (ref 3.4–10.8)

## 2020-06-10 ENCOUNTER — TELEPHONE (OUTPATIENT)
Dept: INTERNAL MEDICINE CLINIC | Age: 30
End: 2020-06-10

## 2020-06-10 NOTE — TELEPHONE ENCOUNTER
----- Message from Mickey Cid sent at 6/10/2020  3:52 PM EDT ----- Regarding: Mathew/Earnest Contact: [phone] Appointment Request From: Stacie Santiago With Provider: Carol Kauffman NP [-Primary Care Physician-] Preferred Date Range: From 6/26/2020 To 7/3/2020 Preferred Times: Any Reason: To address the following health maintenance concerns. [facility] Cervical Cytology Comments: 
Not sure if you want me to schedule or my OB-GYN.

## 2020-07-02 ENCOUNTER — ROUTINE PRENATAL (OUTPATIENT)
Dept: OBGYN CLINIC | Age: 30
End: 2020-07-02

## 2020-07-02 VITALS
HEIGHT: 63 IN | WEIGHT: 191.38 LBS | BODY MASS INDEX: 33.91 KG/M2 | SYSTOLIC BLOOD PRESSURE: 111 MMHG | DIASTOLIC BLOOD PRESSURE: 70 MMHG

## 2020-07-02 DIAGNOSIS — Z34.80 PRENATAL CARE OF MULTIGRAVIDA, ANTEPARTUM: Primary | ICD-10-CM

## 2020-07-02 NOTE — PATIENT INSTRUCTIONS

## 2020-07-02 NOTE — PROGRESS NOTES
Patient mentioned feeling light headed when taking the phenergan. No other concerns today    FETAL SURVEY  A SINGLE VIABLE IUP OF 20W5D IS SEEN WITH FETAL CARDIAC MOTION OBSERVED. FETAL ANATOMY WAS WELL VISUALIZED AND APPEARS WNL. NO ABNORMALITIES WERE SEEN ON TODAYS EXAM.  APPROPRIATE GROWTH MEASURED. SIZE=DATES. RAJANI APPEARS WNL. THE PLACENTA APPEARS TO BE LOCATED 5MM AWAY FROM THE INTERNAL CERVICAL OS OF THE  CERVIX. A TRANSVAGINAL ULTRASOUND WAS PERFORMED TO BETTER VISUALIZE THE CERVIX. THE  CERVIX APPEARS DYNAMIC RANGING BETWEEN 1.5-2.3CM AND FUNNELING BY 20 X 21MM.   GENDER: MALE

## 2020-08-27 ENCOUNTER — ROUTINE PRENATAL (OUTPATIENT)
Dept: OBGYN CLINIC | Age: 30
End: 2020-08-27
Payer: MEDICAID

## 2020-08-27 VITALS
WEIGHT: 199 LBS | SYSTOLIC BLOOD PRESSURE: 108 MMHG | HEIGHT: 63 IN | BODY MASS INDEX: 35.26 KG/M2 | DIASTOLIC BLOOD PRESSURE: 64 MMHG

## 2020-08-27 DIAGNOSIS — Z34.03 ENCOUNTER FOR SUPERVISION OF NORMAL FIRST PREGNANCY IN THIRD TRIMESTER: ICD-10-CM

## 2020-08-27 DIAGNOSIS — Z34.80 SUPERVISION OF OTHER NORMAL PREGNANCY: Primary | ICD-10-CM

## 2020-08-27 LAB
ANTIBODY SCREEN, EXTERNAL: NEGATIVE
GTT, 1 HR, GLUCOLA, EXTERNAL: 103
HCT, EXTERNAL: 30.3
HGB, EXTERNAL: 10.1
HIV, EXTERNAL: NON REACTIVE
PLATELET CNT,   EXTERNAL: 293
T. PALLIDUM, EXTERNAL: NON REACTIVE

## 2020-08-27 PROCEDURE — 0502F SUBSEQUENT PRENATAL CARE: CPT | Performed by: OBSTETRICS & GYNECOLOGY

## 2020-08-27 PROCEDURE — 76815 OB US LIMITED FETUS(S): CPT | Performed by: OBSTETRICS & GYNECOLOGY

## 2020-08-27 NOTE — PROGRESS NOTES
US reassuring today with normal RAJANI. LLP resolved. Normal cervix. Possibly placing child up for adoption.     Signed By: Janes Perry MD     August 27, 2020

## 2020-08-27 NOTE — PROGRESS NOTES
Per US today-LIMITED OB SCAN  A SINGLE VERTEX 28W5D IUP IS SEEN. FETAL CARDIAC MOTION OBSERVED. LIMITED ANATOMY WAS VISUALIZED AND APPEARS WNL. APPROPRIATE GROWTH MEASURED. SIZE =DATES  PLACENTA APPEAR WNL. RAJANI APPEARS INCREASED MEASURING 22CM. A TRANVAGINAL ULTRASOUND WAS PERFORMED TO BETTER VISUALIZE THE CERVIX. THE CERVIX  MEASURING 3.8CM IN LENGTH. THE PLACENTA APPEARS TO MEASURE 6 CM AWAY FROM THE INTERNAL  CERVICAL OS OF THE CERVIX. THERE APPEARS TO BE A ANTERIOR FIBROID SEEN MEASURING 61 X 34 X 58MM. Glucola and labs today.

## 2020-08-27 NOTE — PATIENT INSTRUCTIONS

## 2020-08-29 LAB
BASOPHILS # BLD AUTO: 0 X10E3/UL (ref 0–0.2)
BASOPHILS NFR BLD AUTO: 0 %
BLD GP AB SCN SERPL QL: NEGATIVE
EOSINOPHIL # BLD AUTO: 0.1 X10E3/UL (ref 0–0.4)
EOSINOPHIL NFR BLD AUTO: 1 %
ERYTHROCYTE [DISTWIDTH] IN BLOOD BY AUTOMATED COUNT: 12.5 % (ref 11.7–15.4)
GLUCOSE 1H P 50 G GLC PO SERPL-MCNC: 103 MG/DL (ref 65–139)
HCT VFR BLD AUTO: 30.3 % (ref 34–46.6)
HGB BLD-MCNC: 10.1 G/DL (ref 11.1–15.9)
HIV 1+2 AB+HIV1 P24 AG SERPL QL IA: NON REACTIVE
IMM GRANULOCYTES # BLD AUTO: 0.1 X10E3/UL (ref 0–0.1)
IMM GRANULOCYTES NFR BLD AUTO: 2 %
LYMPHOCYTES # BLD AUTO: 1.8 X10E3/UL (ref 0.7–3.1)
LYMPHOCYTES NFR BLD AUTO: 20 %
MCH RBC QN AUTO: 27.6 PG (ref 26.6–33)
MCHC RBC AUTO-ENTMCNC: 33.3 G/DL (ref 31.5–35.7)
MCV RBC AUTO: 83 FL (ref 79–97)
MONOCYTES # BLD AUTO: 0.9 X10E3/UL (ref 0.1–0.9)
MONOCYTES NFR BLD AUTO: 10 %
NEUTROPHILS # BLD AUTO: 5.9 X10E3/UL (ref 1.4–7)
NEUTROPHILS NFR BLD AUTO: 67 %
PLATELET # BLD AUTO: 293 X10E3/UL (ref 150–450)
RBC # BLD AUTO: 3.66 X10E6/UL (ref 3.77–5.28)
TREPONEMA PALLIDUM IGG+IGM AB [PRESENCE] IN SERUM OR PLASMA BY IMMUNOASSAY: NON REACTIVE
WBC # BLD AUTO: 8.8 X10E3/UL (ref 3.4–10.8)

## 2020-08-31 DIAGNOSIS — Z34.03 ENCOUNTER FOR SUPERVISION OF NORMAL FIRST PREGNANCY IN THIRD TRIMESTER: ICD-10-CM

## 2020-08-31 NOTE — PROGRESS NOTES
Results abstracted and PL updated.  [employer] message sent to patient about her need to start Iron

## 2020-09-25 ENCOUNTER — ROUTINE PRENATAL (OUTPATIENT)
Dept: OBGYN CLINIC | Age: 30
End: 2020-09-25
Payer: MEDICAID

## 2020-09-25 VITALS — SYSTOLIC BLOOD PRESSURE: 128 MMHG | WEIGHT: 217.4 LBS | DIASTOLIC BLOOD PRESSURE: 68 MMHG | BODY MASS INDEX: 38.51 KG/M2

## 2020-09-25 PROCEDURE — 0502F SUBSEQUENT PRENATAL CARE: CPT | Performed by: OBSTETRICS & GYNECOLOGY

## 2020-09-25 NOTE — PROGRESS NOTES
Patient denies nausea and vomiting and vaginal bleeding. States she fell backwards last night carrying her 1 yr old son but is ok.

## 2020-09-25 NOTE — PATIENT INSTRUCTIONS
Weeks 32 to 34 of Your Pregnancy: Care Instructions Your Care Instructions During the last few weeks of your pregnancy, you may have more aches and pains. It's important to rest when you can. Your growing baby is putting more pressure on your bladder. So you may need to urinate more often. Hemorrhoids are also common. These are painful, itchy veins in the rectal area. In the 36th week, most women have a test for group B streptococcus (GBS). GBS is a common bacteria that can live in the vagina and rectum. It can make your baby sick after birth. If you test positive, you will get antibiotics during labor. These will keep your baby from getting the bacteria. You may want to talk with your doctor about banking your baby's umbilical cord blood. This is the blood left in the cord after birth. If you want to save this blood, you must arrange it ahead of time. You can't decide at the last minute. If you haven't already had the Tdap shot during this pregnancy, talk to your doctor about getting it. It will help protect your  against pertussis infection. Follow-up care is a key part of your treatment and safety. Be sure to make and go to all appointments, and call your doctor if you are having problems. It's also a good idea to know your test results and keep a list of the medicines you take. How can you care for yourself at home? Ease hemorrhoids · Get more liquids, fruits, vegetables, and fiber in your diet. This will help keep your stools soft. · Avoid sitting for too long. Lie on your left side several times a day. · Clean yourself with soft, moist toilet paper. Or you can use witch hazel pads or personal hygiene pads. · If you are uncomfortable, try ice packs. Or you can sit in a warm sitz bath. Do these for 20 minutes at a time, as needed. · Use hydrocortisone cream for pain and itching. Two examples are Anusol and Preparation H Hydrocortisone. · Ask your doctor about taking an over-the-counter stool softener. Consider breastfeeding · Experts recommend that women breastfeed for 1 year or longer. · Breast milk may help protect your child from some health problems.  babies are less likely than formula-fed babies to: 
? Get ear infections, colds, diarrhea, and pneumonia. ? Be obese or get diabetes later in life. · Women who breastfeed have less bleeding after the birth. Their uteruses also shrink back faster. · Some women who breastfeed lose weight faster. Making milk burns calories. · Breastfeeding can lower your risk of breast cancer, ovarian cancer, and osteoporosis. Decide about circumcision for boys · As you make this decision, it may help to think about your personal, Mandaeism, and family traditions. You get to decide if you will keep your son's penis natural or if he will be circumcised. · If you decide that you would like to have your baby circumcised, talk with your doctor. You can share your concerns about pain. And you can discuss your preferences for anesthesia. Where can you learn more? Go to http://www.gray.com/ Enter H673 in the search box to learn more about \"Weeks 32 to 34 of Your Pregnancy: Care Instructions. \" Current as of: February 11, 2020               Content Version: 12.6 © 0570-5338 [employer]. Care instructions adapted under license by [employer] (which disclaims liability or warranty for this information). If you have questions about a medical condition or this instruction, always ask your healthcare professional. [place] [address] any warranty or liability for your use of this information.

## 2020-10-09 ENCOUNTER — ROUTINE PRENATAL (OUTPATIENT)
Dept: OBGYN CLINIC | Age: 30
End: 2020-10-09
Payer: MEDICAID

## 2020-10-09 VITALS
DIASTOLIC BLOOD PRESSURE: 78 MMHG | HEIGHT: 63 IN | WEIGHT: 213.38 LBS | SYSTOLIC BLOOD PRESSURE: 120 MMHG | BODY MASS INDEX: 37.81 KG/M2

## 2020-10-09 DIAGNOSIS — Z34.80 SUPERVISION OF OTHER NORMAL PREGNANCY: Primary | ICD-10-CM

## 2020-10-09 DIAGNOSIS — Z34.03 ENCOUNTER FOR SUPERVISION OF NORMAL FIRST PREGNANCY IN THIRD TRIMESTER: ICD-10-CM

## 2020-10-09 PROCEDURE — 0502F SUBSEQUENT PRENATAL CARE: CPT | Performed by: OBSTETRICS & GYNECOLOGY

## 2020-10-09 NOTE — PATIENT INSTRUCTIONS

## 2020-10-09 NOTE — PROGRESS NOTES
Patient states she is having more headaches but has not tried anything. Still not taking Iron as directed- states shes made changes in her diet. Still needs urine labs  Declined Tdap.

## 2020-10-23 ENCOUNTER — ROUTINE PRENATAL (OUTPATIENT)
Dept: OBGYN CLINIC | Age: 30
End: 2020-10-23
Payer: MEDICAID

## 2020-10-23 VITALS — DIASTOLIC BLOOD PRESSURE: 70 MMHG | WEIGHT: 215 LBS | BODY MASS INDEX: 38.09 KG/M2 | SYSTOLIC BLOOD PRESSURE: 118 MMHG

## 2020-10-23 DIAGNOSIS — Z36.85 ANTENATAL SCREENING FOR STREPTOCOCCUS B: Primary | ICD-10-CM

## 2020-10-23 LAB — GRBS, EXTERNAL: NEGATIVE

## 2020-10-23 PROCEDURE — 0502F SUBSEQUENT PRENATAL CARE: CPT | Performed by: OBSTETRICS & GYNECOLOGY

## 2020-10-23 NOTE — PATIENT INSTRUCTIONS

## 2020-10-27 LAB — B-HEM STREP SPEC QL CULT: NEGATIVE

## 2020-11-13 ENCOUNTER — TELEPHONE (OUTPATIENT)
Dept: OBGYN CLINIC | Age: 30
End: 2020-11-13

## 2020-11-13 ENCOUNTER — ROUTINE PRENATAL (OUTPATIENT)
Dept: OBGYN CLINIC | Age: 30
End: 2020-11-13
Payer: MEDICAID

## 2020-11-13 VITALS — WEIGHT: 223 LBS | BODY MASS INDEX: 39.5 KG/M2 | SYSTOLIC BLOOD PRESSURE: 116 MMHG | DIASTOLIC BLOOD PRESSURE: 72 MMHG

## 2020-11-13 DIAGNOSIS — Z34.90 PREGNANCY, UNSPECIFIED GESTATIONAL AGE: Primary | ICD-10-CM

## 2020-11-13 PROCEDURE — 76819 FETAL BIOPHYS PROFIL W/O NST: CPT | Performed by: OBSTETRICS & GYNECOLOGY

## 2020-11-13 PROCEDURE — 0502F SUBSEQUENT PRENATAL CARE: CPT | Performed by: OBSTETRICS & GYNECOLOGY

## 2020-11-13 NOTE — TELEPHONE ENCOUNTER
Patient of SP Calling to report that she is 44 w 6d She is a  B+ blood type Calling to report that she missed her last OB appointment. She said that she is due tomorrow and she is not sure if she needs to be seen. She is not having any contractions but reports \"gushes\" of vaginal fluid last night. She can feel good fetal movement. She said she is just not sure if she needs to be scheduled now or next week. Per SP she wants to have the patient come in at 1 pm today. Patient is good with this plan.

## 2020-11-13 NOTE — PROGRESS NOTES
Pt was sent to L&D for ECV. Arrived to L&D, but was not yet registered. Once pt realized she would need to be there for several hours and that there was possibility of needing  section to follow if version unsuccessful, pt stated that she could not stay and needed to leave. She had childcare issues and did not have anyone to  her child at 4pm or care for her child this evening. She prefers to return Monday for procedure if she does not labor before that. Pt informed that she may labor spontaneously between now and then which would put her at increased risk of complications like cord prolapse and may require us to perform  section, rather than giving opportunity to turn the baby in hopes of a vaginal delivery. L&D notified of situation.

## 2020-11-13 NOTE — PROGRESS NOTES
Pt of Dr. Wilson Tyler presenting for evaluation after gush of fluid this AM between 2-3, and has noticed increased moisture in her underwear today. Good FM, no VB or ctx. Exam:   Abdomen - non-cephalic presentation by leopolds, EFW 8lb  Speculum exam - large amount of thin white discharge, no pooling of fluid c/w amniotic fluid, nitrazine and ferning negative  Cervix: difficult to palpate, soft floppy, no fetal head palpable    VSCAN:  Fetal head to maternal right, non-cephalic presentation    Plan:  Formal ultrasound for RAJANI and assessment of fetal presentation  If non-cephalic and not ruptured, will discuss possibility of ECV, given 39w6d would recommend doing this ASAP, pt with prior successful vaginal delivery of 9lb baby    Elle Freeman MD  11/13/2020  1:37 PM         Ultrasound confirming transverse presentation, RAJANI 24.   Will send pt to L&D for ECV attempt    Elle Freeman MD  11/13/2020  1:54 PM

## 2020-11-13 NOTE — PATIENT INSTRUCTIONS
Week 39 of Your Pregnancy: Care Instructions Your Care Instructions During these final weeks, you may feel anxious to see your new baby.  babies often look different from what you see in pictures or movies. Right after birth, their heads may have a strange shape. Their eyes may be puffy. And their genitals may be swollen. They may also have very dry skin, or red marks on the eyelids, nose, or neck. Still, most parents think their babies are beautiful. Follow-up care is a key part of your treatment and safety. Be sure to make and go to all appointments, and call your doctor if you are having problems. It's also a good idea to know your test results and keep a list of the medicines you take. How can you care for yourself at home? Prepare to breastfeed · If you are breastfeeding, continue to eat healthy foods. · If your health care provider recommends it, keep taking your prenatal vitamins. · Talk to your doctor before you take any medicine or supplement. That's because some medicines can affect your breast milk. · You can help prevent sore nipples if you feed your baby in the correct position. Nurses will help you learn to do this. Choose the right birth control after your baby is born · Women who are breastfeeding can still get pregnant. Use birth control if you don't want to get pregnant. · Intrauterine devices (IUDs) are very effective at preventing pregnancy and can provide birth control for 3 to 10 years, depending on the type. If you talk with your doctor before having your baby, the IUD can be placed right after you give birth. If you decide you want to get pregnant later, you can have it removed. They are safe to use while you are breastfeeding. · A hormonal implant is also very effective at preventing pregnancy. It is placed under the skin of the arm. This can be done right after you give birth.  It releases the hormone progestin and prevents pregnancy for about 3 years. This can also be removed by a doctor at any time. It is safe to use while you are breastfeeding. · Depo-Provera can be used while you are breastfeeding. It is a shot you get every 3 months. · Birth control pills work well. But you need a different kind of pill for the first few weeks after giving birth. And when you start taking these pills, you need to make sure to use another type of birth control for 7 days after you start your pack. · Diaphragms, cervical caps, and condoms with spermicide work less well after birth. If you have a diaphragm or cervical cap, talk to your doctor to see if you need a different size. · Tubal ligation (tying your tubes) and vasectomy are both permanent. These are good options if you are sure you are done having children. Where can you learn more? Go to http://www.gray.com/ Enter J089 in the search box to learn more about \"Week 39 of Your Pregnancy: Care Instructions. \" Current as of: February 11, 2020               Content Version: 12.6 © 7235-1766 [employer]. Care instructions adapted under license by [employer] (which disclaims liability or warranty for this information). If you have questions about a medical condition or this instruction, always ask your healthcare professional. [address] any warranty or liability for your use of this information.

## 2020-11-15 ENCOUNTER — HOSPITAL ENCOUNTER (EMERGENCY)
Age: 30
Discharge: HOME | DRG: 560 | End: 2020-11-15
Payer: MEDICAID

## 2020-11-15 VITALS
TEMPERATURE: 98.5 F | SYSTOLIC BLOOD PRESSURE: 109 MMHG | DIASTOLIC BLOOD PRESSURE: 68 MMHG | HEART RATE: 69 BPM | RESPIRATION RATE: 16 BRPM

## 2020-11-15 DIAGNOSIS — Z3A.40 40 WEEKS GESTATION OF PREGNANCY: ICD-10-CM

## 2020-11-15 DIAGNOSIS — O32.0XX0 UNSTABLE FETAL LIE, ANTEPARTUM, SINGLE OR UNSPECIFIED FETUS: Primary | ICD-10-CM

## 2020-11-15 PROCEDURE — 99283 EMERGENCY DEPT VISIT LOW MDM: CPT

## 2020-11-16 ENCOUNTER — ROUTINE PRENATAL (OUTPATIENT)
Dept: OBGYN CLINIC | Age: 30
End: 2020-11-16
Payer: MEDICAID

## 2020-11-16 ENCOUNTER — HOSPITAL ENCOUNTER (INPATIENT)
Age: 30
LOS: 3 days | Discharge: HOME | DRG: 560 | End: 2020-11-19
Attending: OBSTETRICS & GYNECOLOGY | Admitting: OBSTETRICS & GYNECOLOGY
Payer: MEDICAID

## 2020-11-16 VITALS — DIASTOLIC BLOOD PRESSURE: 68 MMHG | WEIGHT: 227 LBS | BODY MASS INDEX: 40.21 KG/M2 | SYSTOLIC BLOOD PRESSURE: 114 MMHG

## 2020-11-16 DIAGNOSIS — Z34.03 ENCOUNTER FOR SUPERVISION OF NORMAL FIRST PREGNANCY IN THIRD TRIMESTER: Primary | ICD-10-CM

## 2020-11-16 DIAGNOSIS — O32.2XX0 TRANSVERSE LIE OF FETUS, SINGLE OR UNSPECIFIED FETUS: ICD-10-CM

## 2020-11-16 DIAGNOSIS — Z34.93 PREGNANT AND NOT YET DELIVERED IN THIRD TRIMESTER: ICD-10-CM

## 2020-11-16 PROBLEM — Z34.90 PREGNANT: Status: ACTIVE | Noted: 2020-11-16

## 2020-11-16 PROBLEM — Z34.90 PREGNANT AND NOT YET DELIVERED: Status: ACTIVE | Noted: 2020-11-16

## 2020-11-16 LAB
ABO + RH BLD: NORMAL
BLOOD GROUP ANTIBODIES SERPL: NORMAL
ERYTHROCYTE [DISTWIDTH] IN BLOOD BY AUTOMATED COUNT: 14.9 % (ref 11.5–14.5)
HCT VFR BLD AUTO: 31.8 % (ref 35–47)
HGB BLD-MCNC: 10.1 G/DL (ref 11.5–16)
MCH RBC QN AUTO: 26.9 PG (ref 26–34)
MCHC RBC AUTO-ENTMCNC: 31.8 G/DL (ref 30–36.5)
MCV RBC AUTO: 84.8 FL (ref 80–99)
NRBC # BLD: 0 K/UL (ref 0–0.01)
NRBC BLD-RTO: 0 PER 100 WBC
PLATELET # BLD AUTO: 229 K/UL (ref 150–400)
PMV BLD AUTO: 10.1 FL (ref 8.9–12.9)
RBC # BLD AUTO: 3.75 M/UL (ref 3.8–5.2)
SPECIMEN EXP DATE BLD: NORMAL
WBC # BLD AUTO: 8.1 K/UL (ref 3.6–11)

## 2020-11-16 PROCEDURE — 65270000029 HC RM PRIVATE

## 2020-11-16 PROCEDURE — 99218 HC RM OBSERVATION: CPT

## 2020-11-16 PROCEDURE — 74011250636 HC RX REV CODE- 250/636: Performed by: OBSTETRICS & GYNECOLOGY

## 2020-11-16 PROCEDURE — 36415 COLL VENOUS BLD VENIPUNCTURE: CPT

## 2020-11-16 PROCEDURE — 75410000002 HC LABOR FEE PER 1 HR

## 2020-11-16 PROCEDURE — 85027 COMPLETE CBC AUTOMATED: CPT

## 2020-11-16 PROCEDURE — 86900 BLOOD TYPING SEROLOGIC ABO: CPT

## 2020-11-16 PROCEDURE — 74011250636 HC RX REV CODE- 250/636: Performed by: ADVANCED PRACTICE MIDWIFE

## 2020-11-16 PROCEDURE — 0502F SUBSEQUENT PRENATAL CARE: CPT | Performed by: OBSTETRICS & GYNECOLOGY

## 2020-11-16 PROCEDURE — 10907ZC DRAINAGE OF AMNIOTIC FLUID, THERAPEUTIC FROM PRODUCTS OF CONCEPTION, VIA NATURAL OR ARTIFICIAL OPENING: ICD-10-PCS | Performed by: OBSTETRICS & GYNECOLOGY

## 2020-11-16 RX ORDER — TERBUTALINE SULFATE 1 MG/ML
0.25 INJECTION SUBCUTANEOUS ONCE
Status: DISPENSED | OUTPATIENT
Start: 2020-11-16 | End: 2020-11-17

## 2020-11-16 RX ORDER — BUTORPHANOL TARTRATE 1 MG/ML
2 INJECTION INTRAMUSCULAR; INTRAVENOUS
Status: DISCONTINUED | OUTPATIENT
Start: 2020-11-16 | End: 2020-11-16

## 2020-11-16 RX ORDER — NALOXONE HYDROCHLORIDE 0.4 MG/ML
0.4 INJECTION, SOLUTION INTRAMUSCULAR; INTRAVENOUS; SUBCUTANEOUS AS NEEDED
Status: DISCONTINUED | OUTPATIENT
Start: 2020-11-16 | End: 2020-11-17 | Stop reason: HOSPADM

## 2020-11-16 RX ORDER — SODIUM CHLORIDE 0.9 % (FLUSH) 0.9 %
5-40 SYRINGE (ML) INJECTION AS NEEDED
Status: DISCONTINUED | OUTPATIENT
Start: 2020-11-16 | End: 2020-11-19 | Stop reason: HOSPADM

## 2020-11-16 RX ORDER — SODIUM CHLORIDE 0.9 % (FLUSH) 0.9 %
5-40 SYRINGE (ML) INJECTION EVERY 8 HOURS
Status: DISCONTINUED | OUTPATIENT
Start: 2020-11-16 | End: 2020-11-19 | Stop reason: HOSPADM

## 2020-11-16 RX ORDER — ACETAMINOPHEN 325 MG/1
650 TABLET ORAL
Status: DISCONTINUED | OUTPATIENT
Start: 2020-11-16 | End: 2020-11-17 | Stop reason: HOSPADM

## 2020-11-16 RX ORDER — BUTORPHANOL TARTRATE 1 MG/ML
2 INJECTION INTRAMUSCULAR; INTRAVENOUS
Status: COMPLETED | OUTPATIENT
Start: 2020-11-16 | End: 2020-11-16

## 2020-11-16 RX ORDER — ONDANSETRON 2 MG/ML
4 INJECTION INTRAMUSCULAR; INTRAVENOUS
Status: DISCONTINUED | OUTPATIENT
Start: 2020-11-16 | End: 2020-11-17 | Stop reason: HOSPADM

## 2020-11-16 RX ORDER — OXYTOCIN/RINGER'S LACTATE 30/500 ML
10 PLASTIC BAG, INJECTION (ML) INTRAVENOUS AS NEEDED
Status: DISCONTINUED | OUTPATIENT
Start: 2020-11-16 | End: 2020-11-19 | Stop reason: HOSPADM

## 2020-11-16 RX ORDER — OXYTOCIN/RINGER'S LACTATE 30/500 ML
0-20 PLASTIC BAG, INJECTION (ML) INTRAVENOUS
Status: DISCONTINUED | OUTPATIENT
Start: 2020-11-16 | End: 2020-11-19 | Stop reason: HOSPADM

## 2020-11-16 RX ORDER — SODIUM CHLORIDE, SODIUM LACTATE, POTASSIUM CHLORIDE, CALCIUM CHLORIDE 600; 310; 30; 20 MG/100ML; MG/100ML; MG/100ML; MG/100ML
125 INJECTION, SOLUTION INTRAVENOUS CONTINUOUS
Status: DISCONTINUED | OUTPATIENT
Start: 2020-11-16 | End: 2020-11-19 | Stop reason: HOSPADM

## 2020-11-16 RX ORDER — OXYTOCIN/RINGER'S LACTATE 30/500 ML
95 PLASTIC BAG, INJECTION (ML) INTRAVENOUS AS NEEDED
Status: DISCONTINUED | OUTPATIENT
Start: 2020-11-16 | End: 2020-11-19 | Stop reason: HOSPADM

## 2020-11-16 RX ADMIN — OXYTOCIN 1 MILLI-UNITS/MIN: 10 INJECTION INTRAVENOUS at 14:25

## 2020-11-16 RX ADMIN — SODIUM CHLORIDE, SODIUM LACTATE, POTASSIUM CHLORIDE, AND CALCIUM CHLORIDE 125 ML/HR: 600; 310; 30; 20 INJECTION, SOLUTION INTRAVENOUS at 14:00

## 2020-11-16 RX ADMIN — ONDANSETRON 4 MG: 2 INJECTION INTRAMUSCULAR; INTRAVENOUS at 22:35

## 2020-11-16 RX ADMIN — OXYTOCIN 18 MILLI-UNITS/MIN: 10 INJECTION INTRAVENOUS at 22:00

## 2020-11-16 RX ADMIN — BUTORPHANOL TARTRATE 2 MG: 1 INJECTION, SOLUTION INTRAMUSCULAR; INTRAVENOUS at 22:00
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
## 2020-11-16 NOTE — PROGRESS NOTES
Labor Progress Note  Patient seen, fetal heart rate and contraction pattern evaluated.   Visit Vitals  /66   Pulse 76   Temp 98.4 °F (36.9 °C)   Resp 16   Ht 5' 3\" (1.6 m)   Wt 227 lb (103 kg)   LMP 02/25/2020 (Exact Date)   BMI 40.21 kg/m²       Physical Exam:  Cervical Exam:  Deferred   Membranes:  clear fluid  Uterine Activity: 2-3  Fetal Heart Rate: Cat 2 at this time, Cat I prior to   Pitocin 8 miliunits    Assessment/Plan:  Overall reassuring fetal status  Pt wishes to have an unmedicated delivery   Continue with pitocin induction     Jose Watson CNM

## 2020-11-16 NOTE — PROGRESS NOTES
+FM. No ctx or LOF. Was seen in L&D last night and was told baby was in cephalic presentation. To L&D for ECV attempt. Counseled on risks/benefits/alternatives. Discussed slight increased risk of PTL, PROM, cord prolapse, abruption, fetal distress, need for urgent CS, etc with ECV attempt. But also discussed if successful could attempt trial of labor in hopes to avoid CS. Discussed 50-60% success rate, but possibly slightly lower given 40w2d.  Pt understands risks and would like to proceed with ECV

## 2020-11-16 NOTE — PATIENT INSTRUCTIONS
Week 40 of Your Pregnancy: Care Instructions Your Care Instructions By week 36, you have reached your due date. Your baby could be coming any day. But it's a good idea to think ahead to the next few weeks and what might happen. If this is your first time having a baby, try not to worry. If you don't start labor on your own by 41 or 42 weeks, your doctor may recommend giving you medicines to start labor. This care sheet gives you information about how labor can be started. It also gives you some ideas about breathing exercises you can do if you start to feel anxious or if you are trying to relax. Follow-up care is a key part of your treatment and safety. Be sure to make and go to all appointments, and call your doctor if you are having problems. It's also a good idea to know your test results and keep a list of the medicines you take. How can you care for yourself at home? Learn how labor can be started · If you and your baby are both healthy and ready, and if your cervix has started to open, your doctor may \"break your water\" (rupture the amniotic sac). This often starts labor. · If your cervix is not quite ready, you may get a medicine called Pitocin through an IV to start contractions. · If your cervix is still very firm, you may have prostaglandin tablets (misoprostol) placed in your vagina to soften the cervix. Try guided imagery to help you relax · Find a comfortable place to sit or lie down. Close your eyes. · Start by just taking a few deep breaths to help you relax. · Picture a setting that is calm and peaceful. This could be a beach, a mountain setting, a meadow, or a scene that you choose. · Imagine your scene, and try to add some detail. For example, is there a breeze? What does the mia look like? Is it clear, or are there clouds? · It often helps to add a path to your scene.  For example, as you enter the meadow, imagine a path leading you through the meadow to the trees on the other side. As you follow the path farther into the [facility] you feel more and more relaxed. · When you are deep into your scene and are feeling relaxed, take a few minutes to breathe slowly and feel the calm. · When you are ready, slowly take yourself out of the scene back to the present. Tell yourself that you will feel relaxed and refreshed and will bring that sense of calm with you. · Count to 3, and open your eyes. Where can you learn more? Go to http://www.gray.com/ Enter Y140 in the search box to learn more about \"Week 40 of Your Pregnancy: Care Instructions. \" Current as of: February 11, 2020               Content Version: 12.6 © 9533-4352 [employer]. Care instructions adapted under license by [employer] (which disclaims liability or warranty for this information). If you have questions about a medical condition or this instruction, always ask your healthcare professional. [address] any warranty or liability for your use of this information.

## 2020-11-16 NOTE — PROGRESS NOTES
~2202: The patient arrived ambulatory from home requesting an ultrasound. The patient states that the baby has been moving a lot all weekend and she thinks the baby is now vertex. The patient was admitted on Friday for a version (baby was transverse), but left before the procedure could be done. The patient denies leaking of fluid or vaginal bleeding. The patient and her significant other are escorted to [facility] 3.  ~2213: SANJEEV Richardson is called and made aware of the patient's arrival.  Brief SBAR report is given. ~2216: SANJEEV Richardson is at the bedside to see the patient. ~2221: Vertex position is confirmed by SANJEEV Richardson by bedside ultrasound. ~2224: SVE is 1-2cm and baby is high. Verbal order is received to discharge the patient after a reactive NST. The patient agrees with the plan. ~2248: Verbal and a copy of the discharge instructions for rule out labor are given to the patient. The patient verbalizes understanding. All belongings are collected by the patient and she is discharged ambulatory with her significant other. The patient was unable to sign the discharge instructions because the signature pad is not working.

## 2020-11-16 NOTE — PROGRESS NOTES
1219 Patient arrived for ECV. [id number] at bedside to discuss plan of care. 1257 Version performed, baby in vertex position    1305 AROM and cervical check 4/50/-3 clear fluid. 1425 Pitocin started. 1940 Cervical exam by Ethelene Fothergill 4-5/80--3    2319 Pitocin stopped per Freddie Azevedo CNM. 2330 Bedside shift change report given to LAURI Mcgill (oncoming nurse) by Khurram Pabon RN (offgoing nurse). Report included the following information SBAR, MAR and Recent Results.

## 2020-11-16 NOTE — DISCHARGE INSTRUCTIONS
You came to the hospital because you thought you were in labor. This information may help you decide when you need to call your provider and return to the hospital.     Am I in Labor? ?  While each woman may feel contractions differently, these facts may help you decide if you are in true labor. A contraction is a painful tightening of the uterus. It may feel like the baby is sitting up, a bad backache or severe menstrual cramps. Sometimes women have contractions that do not cause cervical change- this is often called \"false labor. \"    In TRUE LABOR contractions: In FALSE LABOR contractions:   * Occur regularly every 5 min or less * Occur irregularly   * Feel stronger and hurt more * Stay the same or space out   * Become stronger with walking * Strength stays the same or they hurt less  when walking   * Pinkish mucus or spotting maybe present          Call your provider if:  Regular, painful contractions every 5 minutes or less for one hour. * You have a gush of fluid or blood from your vaginal (it is normal to have spotting after a vaginal exam or intercourse). * Your baby is not moving as much as usual- at least 8 fetal movements in 1 hour after drinking and resting on your side for 1 hour. Report one or more of the following: SWELLING (Edema)    Fever 101 or above orally   Avoid standing for long periods of time, keep your legs up when you can. Vaginal bleeding (bright red, like a period)  As tolerated and Based on symptoms: Lie down on your side (left or right) if you are seeing spots (floaters) before your eyes, have a headache or if you are dizzy or light-headed. DO NOT LIE ON YOUR BACK!    Uterine Contractions (4 to 6 in 1 hours time) Limit the amount of salty foods you eat   Suspected leakage from your bag of water Try to wear support hose   Decreased fetal movment                      SIGNS AND SYMPTOMS OF LABOR  * Uterine cramping * Low abdominal pressure (pelvic pressure)   * Uterine contractions every 10-15 minutes or more * Dull low backache (intermittent or constant)   * Abdominal cramping with or without diarrhea * Feeling like your baby is pushing down   * Increase or change in vaginal discharge      When these symptoms are experienced. .. STOP what you are doing, lie on your side, drink two or three glasses of water or juice, and wait one hour. If the symptoms worsen call your care provider. If the symptoms go away inform your care provider at the next visit that this happened.

## 2020-11-16 NOTE — PROGRESS NOTES
Bedside US performed. Baby was initially transverse, with fetal head to maternal right. Fetus extremely active. Cervix was checked and was noted to be 3-4/50/-4, no palpable fetal head. 10 minutes later, in preparation for version procedure, position reconfirmed with ultrasound and fetus found to be cephalic. Still extremely active. Given spontaneous version and unstable lie, discussed recommendation to proceed with IOL at this time. Amniotomy was performed with clear fluid. ?subtle meconium. Fetus remained cephalic, with reassuring fetal surveillance throughout.     NST:   Indication: IOL  FHTs: baseline 130s, moderate variability, +accels, no decels  [place]: rare ctx q 10 min  Time: 20 min    Visit Vitals  /70   Pulse 74   Temp 98.8 °F (37.1 °C)   Resp 16   Ht 5' 3\" (1.6 m)   Wt 227 lb (103 kg)   LMP 02/25/2020 (Exact Date)   BMI 40.21 kg/m²

## 2020-11-17 ENCOUNTER — ANESTHESIA EVENT (OUTPATIENT)
Dept: LABOR AND DELIVERY | Age: 30
DRG: 560 | End: 2020-11-17
Payer: MEDICAID

## 2020-11-17 ENCOUNTER — ANESTHESIA (OUTPATIENT)
Dept: LABOR AND DELIVERY | Age: 30
DRG: 560 | End: 2020-11-17
Payer: MEDICAID

## 2020-11-17 PROCEDURE — 74011000250 HC RX REV CODE- 250: Performed by: ANESTHESIOLOGY

## 2020-11-17 PROCEDURE — 74011000258 HC RX REV CODE- 258

## 2020-11-17 PROCEDURE — 3E033VJ INTRODUCTION OF OTHER HORMONE INTO PERIPHERAL VEIN, PERCUTANEOUS APPROACH: ICD-10-PCS | Performed by: OBSTETRICS & GYNECOLOGY

## 2020-11-17 PROCEDURE — 3E0P7VZ INTRODUCTION OF HORMONE INTO FEMALE REPRODUCTIVE, VIA NATURAL OR ARTIFICIAL OPENING: ICD-10-PCS | Performed by: OBSTETRICS & GYNECOLOGY

## 2020-11-17 PROCEDURE — 65410000002 HC RM PRIVATE OB

## 2020-11-17 PROCEDURE — 77030014125 HC TY EPDRL BBMI -B: Performed by: ANESTHESIOLOGY

## 2020-11-17 PROCEDURE — 75410000003 HC RECOV DEL/VAG/CSECN EA 0.5 HR

## 2020-11-17 PROCEDURE — 75410000002 HC LABOR FEE PER 1 HR

## 2020-11-17 PROCEDURE — 74011250636 HC RX REV CODE- 250/636: Performed by: ANESTHESIOLOGY

## 2020-11-17 PROCEDURE — 74011250637 HC RX REV CODE- 250/637: Performed by: OBSTETRICS & GYNECOLOGY

## 2020-11-17 PROCEDURE — 74011000258 HC RX REV CODE- 258: Performed by: OBSTETRICS & GYNECOLOGY

## 2020-11-17 PROCEDURE — A4300 CATH IMPL VASC ACCESS PORTAL: HCPCS

## 2020-11-17 PROCEDURE — 75410000000 HC DELIVERY VAGINAL/SINGLE

## 2020-11-17 PROCEDURE — 74011000250 HC RX REV CODE- 250: Performed by: OBSTETRICS & GYNECOLOGY

## 2020-11-17 PROCEDURE — 76060000078 HC EPIDURAL ANESTHESIA

## 2020-11-17 PROCEDURE — 77030019905 HC CATH URETH INTMIT MDII -A

## 2020-11-17 PROCEDURE — 74011250636 HC RX REV CODE- 250/636: Performed by: OBSTETRICS & GYNECOLOGY

## 2020-11-17 RX ORDER — FENTANYL/BUPIVACAINE/NS/PF 2-1250MCG
1-16 PREFILLED PUMP RESERVOIR EPIDURAL CONTINUOUS
Status: DISCONTINUED | OUTPATIENT
Start: 2020-11-17 | End: 2020-11-17 | Stop reason: HOSPADM

## 2020-11-17 RX ORDER — ONDANSETRON 4 MG/1
4 TABLET, ORALLY DISINTEGRATING ORAL
Status: ACTIVE | OUTPATIENT
Start: 2020-11-17 | End: 2020-11-18

## 2020-11-17 RX ORDER — EPHEDRINE SULFATE/0.9% NACL/PF 50 MG/5 ML
10 SYRINGE (ML) INTRAVENOUS
Status: DISCONTINUED | OUTPATIENT
Start: 2020-11-17 | End: 2020-11-17 | Stop reason: HOSPADM

## 2020-11-17 RX ORDER — OXYTOCIN/RINGER'S LACTATE 30/500 ML
95 PLASTIC BAG, INJECTION (ML) INTRAVENOUS AS NEEDED
Status: DISCONTINUED | OUTPATIENT
Start: 2020-11-17 | End: 2020-11-19 | Stop reason: HOSPADM

## 2020-11-17 RX ORDER — DOCUSATE SODIUM 100 MG/1
100 CAPSULE, LIQUID FILLED ORAL 2 TIMES DAILY
Status: DISCONTINUED | OUTPATIENT
Start: 2020-11-17 | End: 2020-11-19 | Stop reason: HOSPADM

## 2020-11-17 RX ORDER — SIMETHICONE 80 MG
80 TABLET,CHEWABLE ORAL
Status: DISCONTINUED | OUTPATIENT
Start: 2020-11-17 | End: 2020-11-19 | Stop reason: HOSPADM

## 2020-11-17 RX ORDER — SODIUM CHLORIDE 900 MG/100ML
INJECTION INTRAVENOUS
Status: COMPLETED
Start: 2020-11-17 | End: 2020-11-17

## 2020-11-17 RX ORDER — FENTANYL CITRATE 50 UG/ML
INJECTION, SOLUTION INTRAMUSCULAR; INTRAVENOUS AS NEEDED
Status: DISCONTINUED | OUTPATIENT
Start: 2020-11-17 | End: 2020-11-17 | Stop reason: HOSPADM

## 2020-11-17 RX ORDER — BUPIVACAINE HYDROCHLORIDE 5 MG/ML
30 INJECTION, SOLUTION EPIDURAL; INTRACAUDAL AS NEEDED
Status: DISCONTINUED | OUTPATIENT
Start: 2020-11-17 | End: 2020-11-17 | Stop reason: HOSPADM

## 2020-11-17 RX ORDER — BUPIVACAINE HYDROCHLORIDE 2.5 MG/ML
30 INJECTION, SOLUTION EPIDURAL; INFILTRATION; INTRACAUDAL ONCE
Status: DISCONTINUED | OUTPATIENT
Start: 2020-11-17 | End: 2020-11-17 | Stop reason: HOSPADM

## 2020-11-17 RX ORDER — SWAB
1 SWAB, NON-MEDICATED MISCELLANEOUS DAILY
Status: DISCONTINUED | OUTPATIENT
Start: 2020-11-18 | End: 2020-11-19 | Stop reason: HOSPADM

## 2020-11-17 RX ORDER — EPHEDRINE SULFATE/0.9% NACL/PF 50 MG/5 ML
12.5 SYRINGE (ML) INTRAVENOUS
Status: DISCONTINUED | OUTPATIENT
Start: 2020-11-17 | End: 2020-11-17 | Stop reason: HOSPADM

## 2020-11-17 RX ORDER — OXYTOCIN/RINGER'S LACTATE 30/500 ML
10 PLASTIC BAG, INJECTION (ML) INTRAVENOUS AS NEEDED
Status: COMPLETED | OUTPATIENT
Start: 2020-11-17 | End: 2020-11-17

## 2020-11-17 RX ORDER — FENTANYL CITRATE 50 UG/ML
INJECTION, SOLUTION INTRAMUSCULAR; INTRAVENOUS
Status: COMPLETED
Start: 2020-11-17 | End: 2020-11-17

## 2020-11-17 RX ORDER — BUPIVACAINE HYDROCHLORIDE 2.5 MG/ML
INJECTION, SOLUTION EPIDURAL; INFILTRATION; INTRACAUDAL
Status: COMPLETED
Start: 2020-11-17 | End: 2020-11-17

## 2020-11-17 RX ORDER — FENTANYL/BUPIVACAINE/NS/PF 2-1250MCG
1-16 PREFILLED PUMP RESERVOIR EPIDURAL CONTINUOUS
Status: DISCONTINUED | OUTPATIENT
Start: 2020-11-17 | End: 2020-11-19 | Stop reason: HOSPADM

## 2020-11-17 RX ORDER — HYDROCORTISONE ACETATE PRAMOXINE HCL 2.5; 1 G/100G; G/100G
CREAM TOPICAL AS NEEDED
Status: DISCONTINUED | OUTPATIENT
Start: 2020-11-17 | End: 2020-11-19 | Stop reason: HOSPADM

## 2020-11-17 RX ORDER — IBUPROFEN 400 MG/1
800 TABLET ORAL EVERY 8 HOURS
Status: DISCONTINUED | OUTPATIENT
Start: 2020-11-17 | End: 2020-11-19 | Stop reason: HOSPADM

## 2020-11-17 RX ORDER — FENTANYL CITRATE 50 UG/ML
100 INJECTION, SOLUTION INTRAMUSCULAR; INTRAVENOUS ONCE
Status: ACTIVE | OUTPATIENT
Start: 2020-11-17 | End: 2020-11-17

## 2020-11-17 RX ORDER — ZOLPIDEM TARTRATE 5 MG/1
5 TABLET ORAL
Status: DISCONTINUED | OUTPATIENT
Start: 2020-11-17 | End: 2020-11-19 | Stop reason: HOSPADM

## 2020-11-17 RX ORDER — DIPHENHYDRAMINE HYDROCHLORIDE 50 MG/ML
12.5 INJECTION, SOLUTION INTRAMUSCULAR; INTRAVENOUS
Status: DISCONTINUED | OUTPATIENT
Start: 2020-11-17 | End: 2020-11-17 | Stop reason: HOSPADM

## 2020-11-17 RX ORDER — NALOXONE HYDROCHLORIDE 0.4 MG/ML
0.4 INJECTION, SOLUTION INTRAMUSCULAR; INTRAVENOUS; SUBCUTANEOUS AS NEEDED
Status: DISCONTINUED | OUTPATIENT
Start: 2020-11-17 | End: 2020-11-17 | Stop reason: HOSPADM

## 2020-11-17 RX ORDER — DIPHENHYDRAMINE HCL 25 MG
25 CAPSULE ORAL
Status: DISCONTINUED | OUTPATIENT
Start: 2020-11-17 | End: 2020-11-19 | Stop reason: HOSPADM

## 2020-11-17 RX ORDER — LIDOCAINE HYDROCHLORIDE AND EPINEPHRINE 15; 5 MG/ML; UG/ML
INJECTION, SOLUTION EPIDURAL AS NEEDED
Status: DISCONTINUED | OUTPATIENT
Start: 2020-11-17 | End: 2020-11-17 | Stop reason: HOSPADM

## 2020-11-17 RX ORDER — MISOPROSTOL 200 UG/1
800 TABLET ORAL ONCE
Status: COMPLETED | OUTPATIENT
Start: 2020-11-17 | End: 2020-11-17

## 2020-11-17 RX ORDER — ACETAMINOPHEN 325 MG/1
650 TABLET ORAL
Status: DISCONTINUED | OUTPATIENT
Start: 2020-11-17 | End: 2020-11-19 | Stop reason: HOSPADM

## 2020-11-17 RX ORDER — HYDROCODONE BITARTRATE AND ACETAMINOPHEN 5; 325 MG/1; MG/1
1 TABLET ORAL
Status: DISCONTINUED | OUTPATIENT
Start: 2020-11-17 | End: 2020-11-19 | Stop reason: HOSPADM

## 2020-11-17 RX ORDER — FENTANYL CITRATE 50 UG/ML
100 INJECTION, SOLUTION INTRAMUSCULAR; INTRAVENOUS ONCE
Status: DISCONTINUED | OUTPATIENT
Start: 2020-11-17 | End: 2020-11-17 | Stop reason: HOSPADM

## 2020-11-17 RX ORDER — BUPIVACAINE HYDROCHLORIDE 2.5 MG/ML
INJECTION, SOLUTION EPIDURAL; INFILTRATION; INTRACAUDAL AS NEEDED
Status: DISCONTINUED | OUTPATIENT
Start: 2020-11-17 | End: 2020-11-17 | Stop reason: HOSPADM

## 2020-11-17 RX ORDER — PEPPERMINT OIL
SPIRIT ORAL ONCE
Status: DISPENSED | OUTPATIENT
Start: 2020-11-18 | End: 2020-11-18

## 2020-11-17 RX ORDER — NALOXONE HYDROCHLORIDE 0.4 MG/ML
0.4 INJECTION, SOLUTION INTRAMUSCULAR; INTRAVENOUS; SUBCUTANEOUS AS NEEDED
Status: DISCONTINUED | OUTPATIENT
Start: 2020-11-17 | End: 2020-11-19 | Stop reason: HOSPADM

## 2020-11-17 RX ADMIN — SODIUM CHLORIDE 100 ML: 900 INJECTION INTRAVENOUS at 17:39

## 2020-11-17 RX ADMIN — SODIUM CHLORIDE, SODIUM LACTATE, POTASSIUM CHLORIDE, AND CALCIUM CHLORIDE 125 ML/HR: 600; 310; 30; 20 INJECTION, SOLUTION INTRAVENOUS at 05:30

## 2020-11-17 RX ADMIN — OXYTOCIN 10000 MILLI-UNITS: 10 INJECTION INTRAVENOUS at 16:58

## 2020-11-17 RX ADMIN — BUPIVACAINE HYDROCHLORIDE 3 ML: 2.5 INJECTION, SOLUTION EPIDURAL; INFILTRATION; INTRACAUDAL; PERINEURAL at 10:03

## 2020-11-17 RX ADMIN — Medication 10 ML/HR: at 10:12

## 2020-11-17 RX ADMIN — BUPIVACAINE HYDROCHLORIDE 3 ML: 2.5 INJECTION, SOLUTION EPIDURAL; INFILTRATION; INTRACAUDAL; PERINEURAL at 10:01

## 2020-11-17 RX ADMIN — ACETAMINOPHEN 650 MG: 325 TABLET ORAL at 22:10

## 2020-11-17 RX ADMIN — SODIUM CHLORIDE, SODIUM LACTATE, POTASSIUM CHLORIDE, AND CALCIUM CHLORIDE 125 ML/HR: 600; 310; 30; 20 INJECTION, SOLUTION INTRAVENOUS at 09:54

## 2020-11-17 RX ADMIN — FENTANYL CITRATE 100 MCG: 50 INJECTION, SOLUTION INTRAMUSCULAR; INTRAVENOUS at 09:59

## 2020-11-17 RX ADMIN — LIDOCAINE HYDROCHLORIDE,EPINEPHRINE BITARTRATE 5 ML: 15; .005 INJECTION, SOLUTION EPIDURAL; INFILTRATION; INTRACAUDAL; PERINEURAL at 09:57

## 2020-11-17 RX ADMIN — BUPIVACAINE HYDROCHLORIDE 3 ML: 2.5 INJECTION, SOLUTION EPIDURAL; INFILTRATION; INTRACAUDAL; PERINEURAL at 10:05

## 2020-11-17 RX ADMIN — TRANEXAMIC ACID 1000 MG: 1 INJECTION, SOLUTION INTRAVENOUS at 17:38

## 2020-11-17 RX ADMIN — IBUPROFEN 800 MG: 400 TABLET, FILM COATED ORAL at 19:14

## 2020-11-17 RX ADMIN — MISOPROSTOL 800 MCG: 200 TABLET ORAL at 17:05

## 2020-11-17 NOTE — ANESTHESIA PREPROCEDURE EVALUATION
Relevant Problems   No relevant active problems       Anesthetic History               Review of Systems / Medical History      Pulmonary                   Neuro/Psych              Cardiovascular                       GI/Hepatic/Renal                Endo/Other             Other Findings            Physical Exam    Airway  Mallampati: II  TM Distance: > 6 cm  Neck ROM: normal range of motion   Mouth opening: Normal     Cardiovascular  Regular rate and rhythm,  S1 and S2 normal,  no murmur, click, rub, or gallop             Dental  No notable dental hx       Pulmonary  Breath sounds clear to auscultation               Abdominal  GI exam deferred       Other Findings            Anesthetic Plan    ASA: 3  Anesthesia type: epidural            Anesthetic plan and risks discussed with: Patient

## 2020-11-17 NOTE — ANESTHESIA PROCEDURE NOTES
Epidural Block    Performed by: Rere Vincent DO  Authorized by: Rere Vincent DO     Pre-Procedure  Indication: labor epidural    Preanesthetic Checklist: patient identified, risks and benefits discussed, anesthesia consent, site marked, patient being monitored, timeout performed and anesthesia consent      Epidural:   Patient position:  Seated  Prep region:  Lumbar  Prep: Chlorhexidine    Location:  L3-4    Needle and Epidural Catheter:   Needle Type:  Tuohy  Needle Gauge:  17 G  Injection Technique:  Loss of resistance using air  Attempts:  1  Catheter Size:  19 G  Events: no blood with aspiration, no cerebrospinal fluid with aspiration, no paresthesia and negative aspiration test    Test Dose:  Bupivacaine 0.25% and negative    Assessment:   Catheter Secured:  Tegaderm and tape  Insertion:  Uncomplicated  Patient tolerance:  Patient tolerated the procedure well with no immediate complications  Reported improvement

## 2020-11-17 NOTE — L&D DELIVERY NOTE
Delivery Summary    Patient: Carolynn Garcia MRN: [id number]  SSN: [id number]    YOB: 1990  Age: 30 y.o. Sex: female      Patient was found to be completely dilated. Fetal head was noted to be in OP presentation. She pushed with good maternal effort and steady descent of fetal head for a total of approximately 2 hrs (with rest period in between). Fetal head was noted to be  and subsequently delivered without difficulty after rotating to OA position. Good perineal support and gentle back pressure was applied during delivery of head. After head delivered, infant restituted to maternal left. Nuchal cord  was not noted. Next the anterior shoulder delivered with gentle downward guidance, immediately followed by posterior shoulder and body. Infant cried spontaneously and was placed on maternal abdomen. Cord was allowed to pulsate x 1 minute, it was subsequently doubly clamped and cut. Cord gas was not obtained at this time as baby was vigorous. Placenta delivered within 10 minutes, in tact. Next perineum was inspected. No lacerations. Fundus firm. But more than average blood loss with initial EBL 1140cc. 800mcg cytotec placed rectally, pitocin running. Given subsequent additional clot, will give TXA 1g. Will continue to monitor bleeding closely.     Information for the patient's :  Ulysses Brandy [[id number]]       Labor Events:    Labor: No    Steroids: None   Cervical Ripening Date/Time:       Cervical Ripening Type: None   Antibiotics During Labor: No   Rupture Identifier:      Rupture Date/Time: 2020 1:05 PM   Rupture Type: AROM   Amniotic Fluid Volume:      Amniotic Fluid Description: Clear    Amniotic Fluid Odor: None    Induction: Oxytocin       Induction Date/Time:        Indications for Induction: Other(comment)    Augmentation: None   Augmentation Date/Time:      Indications for Augmentation:     Labor complications: None       Additional complications:        Delivery Events:  Indications For Episiotomy:     Episiotomy: None   Perineal Laceration(s):     Repaired:     Periurethral Laceration Location:      Repaired:     Labial Laceration Location:     Repaired:     Sulcal Laceration Location:     Repaired:     Vaginal Laceration Location:     Repaired:     Cervical Laceration Location:     Repaired:     Repair Suture:     Number of Repair Packets:     Estimated Blood Loss (ml):  ml   Quantitative Blood Loss (ml)                Delivery Date: 2020    Delivery Time: 4:53 PM  Delivery Type: Vaginal, Spontaneous  Sex:  Male    Gestational Age: 44w3d   Delivery Clinician:  Sussy Montanez  Living Status: Living   Delivery Location: L&D Room 8          APGARS  One minute Five minutes Ten minutes   Skin color: 1   1        Heart rate: 2   2        Grimace: 2   2        Muscle tone: 2   2        Breathin   2        Totals: 9   9            Presentation: Vertex    Position:        Resuscitation Method:  Tactile Stimulation;Suctioning-bulb     Meconium Stained:        Cord Information: 3 Vessels  Complications: None  Cord around:    Delayed cord clamping? Yes  Cord clamped date/time:2020  4:59 PM  Disposition of Cord Blood: Discard    Blood Gases Sent?: No    Placenta:  Date/Time: 2020  5:02 PM  Removal: Spontaneous      Appearance:       [place] Measurements:  Birth Weight:        Birth Length:        Head Circumference:        Chest Circumference:       Abdominal Girth: Other Providers:   Shahriar Sanchez, Obstetrician;Primary Nurse;Primary  Nurse           Group B Strep: No results found for: GRBSEXT, GRBSEXT  Information for the patient's :  Elizabethcelia Krystin [[id number]]   No results found for: ABORH, PCTABR, PCTDIG, BILI, ABORHEXT, ABORH     No results for input(s): PCO2CB, PO2CB, HCO3I, SO2I, IBD, PTEMPI, SPECTI, PHICB, ISITE, IDEV, IALLEN in the last 72 hours.

## 2020-11-17 NOTE — PROGRESS NOTES
S:  Pt doing well this morning. No acute overnight events. Pt had pit rest from midnight to 3am overnight. Pitocin now at 10. Pt with just mild discomfort with contractions at this time. Per CNM, fetal head somewhat more engaged in pelvis but cervix still 4-5cm. +FM, no LOF, no VB, no ctx. Denies N/V, CP/SOB, fevers/chills, increased edema or other concerns. O:  Patient Vitals for the past 24 hrs:   Temp Pulse Resp BP   11/17/20 0430  71  (!) 104/59   11/17/20 0304 98.1 °F (36.7 °C) 71 16 (!) 91/58   11/16/20 2320 98.1 °F (36.7 °C) 68 16 (!) 93/52   11/16/20 2127 98.7 °F (37.1 °C)      11/16/20 1932 98.4 °F (36.9 °C) 73 16 (!) 97/59   11/16/20 1700 98.4 °F (36.9 °C)      11/16/20 1527  76  108/66   11/16/20 1500 98.4 °F (36.9 °C)      11/16/20 1231 98.8 °F (37.1 °C) 74 16 102/70     Gen - A&O, NAD, resting comfortably  HEENT - normocephalic  Resp - non-labored  CV - heart rate wnl  Abd - soft, nt, nd, gravid, no rebound or guarding   - deferred (CNM check 4-5cm 30 min ago)  Ext - no edema bilaterally    NST:   Indication: IOL, unstable lie  FHTs: baseline 120s, moderate variability, +accels, no decels  [place]: ctx q 2-3 min  Time: >20 min    A/P:  30 y.o. [id number] at 40w3d admitted for IOL for unstable lie.  AROM 1pm on 11/17.     -continue titration of pitocin  -Al Jimenez MD  11/17/2020  7:36 AM

## 2020-11-17 NOTE — PROGRESS NOTES
Labor Progress Note  Patient seen, fetal heart rate and contraction pattern evaluated, pt breathing through contractions - multiple position changes since last check- flying roberto L & R, L/R side lying releasedavid-   Visit Vitals  BP (!) 97/59 Comment: patient denies feeling dizzy or lightheaded   Pulse 73 Comment: patient denies feeling dizzy or lightheaded   Temp 98.4 °F (36.9 °C)   Resp 16   Ht 5' 3\" (1.6 m)   Wt 227 lb (103 kg)   LMP 02/25/2020 (Exact Date)   BMI 40.21 kg/m²       Physical Exam:  Cervical Exam:  deferred  Membranes:  clear fluid leaking  Uterine Activity: 2  Fetal Heart Rate: Cat 1    Assessment/Plan:  Reassuring fetal status, Labor   Fetal head palpable via leopold's over symphysis     Continue with plan for vaginal delivery and pit augmentation. Pain management as desires    Jordan Weniberg CNM            .

## 2020-11-17 NOTE — ANESTHESIA POSTPROCEDURE EVALUATION
Post-Anesthesia Evaluation and Assessment    Patient: Jaret Ojeda MRN: [id number]  SSN: [id number]    YOB: 1990  Age: 30 y.o. Sex: female      I have evaluated the patient and they are stable and ready for discharge from the PACU. Cardiovascular Function/Vital Signs  Visit Vitals  /70   Pulse 76   Temp 37.1 °C (98.7 °F)   Resp 16   Ht 5' 3\" (1.6 m)   Wt 103 kg (227 lb)   SpO2 97%   BMI 40.21 kg/m²       Patient is status post * No anesthesia type entered * anesthesia for * No procedures listed *. Nausea/Vomiting: None    Postoperative hydration reviewed and adequate. Pain:  Pain Scale 1: Numeric (0 - 10) (11/17/20 1716)  Pain Intensity 1: 9 (11/17/20 1716)   Managed    Neurological Status:   Neuro (WDL): Within Defined Limits (11/17/20 1716)   At baseline    Mental Status, Level of Consciousness: Alert and  oriented to person, place, and time    Pulmonary Status:   O2 Device: Room air (11/16/20 1231)   Adequate oxygenation and airway patent    Complications related to anesthesia: None    Post-anesthesia assessment completed.  No concerns    Signed By: Lokesh Partida MD     November 17, 2020              * No procedures listed *.    epidural    <BSHSIANPOST>    INITIAL Post-op Vital signs:   Vitals Value Taken Time   /70 11/17/2020  5:47 PM   Temp     Pulse 76 11/17/2020  5:47 PM   Resp 16 11/17/2020  5:47 PM   SpO2

## 2020-11-17 NOTE — PROGRESS NOTES
Labor Progress Note  Patient seen, fetal heart rate and contraction pattern evaluated, patient examined.    Visit Vitals  BP (!) 104/59   Pulse 71   Temp 98.1 °F (36.7 °C)   Resp 16   Ht 5' 3\" (1.6 m)   Wt 227 lb (103 kg)   LMP 02/25/2020 (Exact Date)   BMI 40.21 kg/m²       Physical Exam:  Cervical Exam:  4-5/80/-3 fetal head againist cervix and fetal head in upper pelvis   Membranes:  clear fluid  Uterine Activity: 2-4  Fetal Heart Rate: Reactive  Pitocin 10 miliunits    Assessment/Plan:  Reassuring fetal status, Continue plan for vaginal delivery   Pt not in active labor at this time  Continue augmentation     Katie Candelario CNM

## 2020-11-17 NOTE — PROGRESS NOTES
1533 Bedside report received from Berenice Dalton RN. Pt reports pain in her upper abdomen, slight pressure to push. 1534 Resume pushing. 1 Dr Clair Kussmaul at bedside for delivery. 1653  LTMI. See delivery record. 1950 Bedside report given to [address] S[address] Benitez RN.

## 2020-11-17 NOTE — PROGRESS NOTES
@2330 Bedside shift change report given to LAURI Marin RN (oncoming nurse) by Adan Stanford (offgoing nurse). Report included the following information SBAR, Kardex, Intake/Output, MAR and Recent Results.

## 2020-11-17 NOTE — PROGRESS NOTES
Labor Progress Note  Patient seen, fetal heart rate and contraction pattern evaluated, patient examined.   Visit Vitals  /66   Pulse 76   Temp 98.4 °F (36.9 °C)   Resp 16   Ht 5' 3\" (1.6 m)   Wt 227 lb (103 kg)   LMP 02/25/2020 (Exact Date)   BMI 40.21 kg/m²       Physical Exam:  Cervical Exam:  4-5/80/-3 fetal head palpable against cervix out of pelvis  Membranes:  clear fluid  Uterine Activity: 2-3  Fetal Heart Rate: cat 1  Pitocin 14 miliunits     Assessment/Plan:  Reassuring fetal status  Continue plan for vaginal delivery   Continue augmentation   Position changes to facilitate fetal decent- flying cow girl, walchers, shake apples, spinning babies     Jordan Weinberg CNM

## 2020-11-17 NOTE — PROGRESS NOTES
Labor Progress Note  Pt sleeping s/p stadol - at 20 milliunits of pitocin for 45 mins-   Visit Vitals  BP (!) 97/59 Comment: patient denies feeling dizzy or lightheaded   Pulse 73 Comment: patient denies feeling dizzy or lightheaded   Temp 98.7 °F (37.1 °C)   Resp 16   Ht 5' 3\" (1.6 m)   Wt 227 lb (103 kg)   LMP 02/25/2020 (Exact Date)   BMI 40.21 kg/m²       Physical Exam:  Cervical Exam:  deferred  Uterine Activity: 2-4  Fetal Heart Rate: Cat 1     Assessment/Plan:  Reassuring fetal status, Continue plan for vaginal delivery   Pit rest x 2 hours then restart  Leave belly band in place.     Ryder Harris CNM

## 2020-11-17 NOTE — ANESTHESIA PREPROCEDURE EVALUATION
Relevant Problems   No relevant active problems       Anesthetic History   No history of anesthetic complications            Review of Systems / Medical History  Patient summary reviewed, nursing notes reviewed and pertinent labs reviewed    Pulmonary  Within defined limits                 Neuro/Psych         Headaches    Comments: Weakness of both legs   Acute midline low back pain without sciatica   Frequent headaches    Cardiovascular                  Exercise tolerance: >4 METS     GI/Hepatic/Renal     GERD           Endo/Other        Morbid obesity and anemia     Other Findings   Comments: H/O failed epidural attempts with prior pregnancy           Physical Exam    Airway  Mallampati: III  TM Distance: 4 - 6 cm  Neck ROM: normal range of motion   Mouth opening: Normal     Cardiovascular  Regular rate and rhythm,  S1 and S2 normal,  no murmur, click, rub, or gallop  Rhythm: regular  Rate: normal         Dental  No notable dental hx       Pulmonary  Breath sounds clear to auscultation               Abdominal  GI exam deferred       Other Findings            Anesthetic Plan    ASA: 3  Anesthesia type: epidural            Anesthetic plan and risks discussed with: Patient

## 2020-11-18 PROCEDURE — 74011250637 HC RX REV CODE- 250/637: Performed by: OBSTETRICS & GYNECOLOGY

## 2020-11-18 PROCEDURE — 65410000002 HC RM PRIVATE OB

## 2020-11-18 RX ADMIN — IBUPROFEN 800 MG: 400 TABLET, FILM COATED ORAL at 20:49

## 2020-11-18 RX ADMIN — ACETAMINOPHEN 650 MG: 325 TABLET ORAL at 11:02

## 2020-11-18 RX ADMIN — DOCUSATE SODIUM 100 MG: 100 CAPSULE, LIQUID FILLED ORAL at 20:49

## 2020-11-18 RX ADMIN — DOCUSATE SODIUM 100 MG: 100 CAPSULE, LIQUID FILLED ORAL at 12:52

## 2020-11-18 RX ADMIN — HYDROCODONE BITARTRATE AND ACETAMINOPHEN 1 TABLET: 5; 325 TABLET ORAL at 18:45

## 2020-11-18 RX ADMIN — ACETAMINOPHEN 650 MG: 325 TABLET ORAL at 04:41

## 2020-11-18 RX ADMIN — IBUPROFEN 800 MG: 400 TABLET, FILM COATED ORAL at 04:41

## 2020-11-18 RX ADMIN — IBUPROFEN 800 MG: 400 TABLET, FILM COATED ORAL at 12:52

## 2020-11-18 NOTE — ROUTINE PROCESS
TRANSFER - IN REPORT: 
 
Verbal report received from SAMEERA Prasad RN (name) on Flaquita Heath  being received from L&D (unit) for routine progression of care Report consisted of patients Situation, Background, Assessment and  
Recommendations(SBAR). Information from the following report(s) SBAR was reviewed with the receiving nurse. Opportunity for questions and clarification was provided. Assessment completed upon patients arrival to unit and care assumed.

## 2020-11-18 NOTE — PROGRESS NOTES
Postpartum Progress Note    Subjective: Pt doing well. No acute events overnight. Pain controlled. Lochia less than menses. Tolerating diet, ambulating and voiding without difficulty. Scant edema. Breastfeeding without difficulty. No other concerns. Objective:  Patient Vitals for the past 12 hrs:   Temp Pulse Resp BP   11/18/20 0600 98 °F (36.7 °C) 73 16 117/69   11/17/20 2340 98.2 °F (36.8 °C) 69 16 109/65   11/17/20 2200 98.5 °F (36.9 °C) 73 16 112/75   11/17/20 2100 99 °F (37.2 °C) 73 16 115/71   11/17/20 2002  73  111/71     Physical Exam:  Gen-A&O, NAD, resting comfortably   CV-regular rate  Resp-non-labored  Abd-nt, nd, fundus firm below the umbilicus  -scant blood on pad  Ext-trace edema    No results found for this or any previous visit (from the past 24 hour(s)). Assessment/Plan:  34yo PPD1 s/p TSVD, uncomplicated. Overall doing well.      Routine postpartum care:  -general diet  -voiding without difficulty  -ice packs, peripads to perineum prn  -cont to monitor bleeding  -ibuprofen/oxycodone for pain control  -stool softeners prn    Postpartum plans:  -breastfeeding   -male, desires circ    Dispo: anticipate discharge home [address] Roman MD  [facility] [place]

## 2020-11-18 NOTE — PROGRESS NOTES
Bedside and Verbal shift change report given to RONAL Contreras RN (oncoming nurse) by Chio Ramirez RN (offgoing nurse). Report included the following information SBAR.

## 2020-11-18 NOTE — LACTATION NOTE
This note was copied from a baby's chart. Initial Lactation Consultation: Infant born vaginally yesterday afternoon to a  mom at 40 3/7 weeks gestation. Mom nursed her first child for a few weeks. This infant seen at breast; deep latch noted with rhythmic sucking and swallowing noted. Encouraged mom to nurse 8-12 times per day or in response to feeding cues and to offer both breasts at each feeding.

## 2020-11-18 NOTE — PROGRESS NOTES
1940: Bedside shift change report given to NIKOLAI Pruitt RN (oncoming nurse) by Susan Encarnacion RN (offgoing nurse). Report included the following information SBAR. 2040: TRANSFER - OUT REPORT:    Verbal report given to BOB Ramirez RN(name) on Jay Marking  being transferred to MIU(unit) for routine progression of care       Report consisted of patients Situation, Background, Assessment and   Recommendations(SBAR). Information from the following report(s) SBAR was reviewed with the receiving nurse. Lines:   Peripheral IV 11/16/20 Anterior;Distal;Right Forearm (Active)   Site Assessment Clean, dry, & intact 11/17/20 0755   Phlebitis Assessment 0 11/17/20 0755   Infiltration Assessment 0 11/17/20 0755   Dressing Status Clean, dry, & intact 11/17/20 0755   Dressing Type Transparent;Tape 11/17/20 0755   Hub Color/Line Status Pink 11/17/20 0755        Opportunity for questions and clarification was provided.       Patient transported with:   Registered Nurse

## 2020-11-19 VITALS
BODY MASS INDEX: 40.22 KG/M2 | TEMPERATURE: 97.9 F | WEIGHT: 227 LBS | HEIGHT: 63 IN | RESPIRATION RATE: 16 BRPM | HEART RATE: 69 BPM | OXYGEN SATURATION: 97 % | DIASTOLIC BLOOD PRESSURE: 70 MMHG | SYSTOLIC BLOOD PRESSURE: 103 MMHG

## 2020-11-19 PROCEDURE — 74011250637 HC RX REV CODE- 250/637: Performed by: OBSTETRICS & GYNECOLOGY

## 2020-11-19 RX ADMIN — ACETAMINOPHEN 650 MG: 325 TABLET ORAL at 00:14

## 2020-11-19 RX ADMIN — IBUPROFEN 800 MG: 400 TABLET, FILM COATED ORAL at 09:27

## 2020-11-19 RX ADMIN — Medication 1 TABLET: at 09:27

## 2020-11-19 RX ADMIN — DOCUSATE SODIUM 100 MG: 100 CAPSULE, LIQUID FILLED ORAL at 09:27

## 2020-11-19 NOTE — ROUTINE PROCESS
0730: Bedside shift change report given to VIVI Dennis RN (oncoming nurse) by HAROON Sauceda RN (offgoing nurse). Report included the following information SBAR.  
5973: Discharge instructions reviewed with pt and all questions answered. Follow up in 6 weeks with Dr. Les Bland. Pt discharged in wheelchair.
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
## 2020-11-19 NOTE — PROGRESS NOTES
Postpartum Progress Note    Subjective: Pt doing well. No issues overnight. Pain controlled. Lochia less than menses. Tolerating diet, ambulating and voiding without difficulty. Scant edema. Breastfeeding without difficulty. No other concerns. Objective:  Patient Vitals for the past 24 hrs:   Temp Pulse Resp BP   11/19/20 0333 97.9 °F (36.6 °C) 100 15 123/72   11/18/20 2051 98 °F (36.7 °C) 80 15 120/70   11/18/20 1841 98 °F (36.7 °C) 76 14 107/65   11/18/20 1101 98.2 °F (36.8 °C) 81 16 (!) 97/55     Physical Exam:  Gen-A&O, NAD, resting comfortably   CV-regular rate  Resp-non-labored  Abd-nt, nd, fundus firm below the umbilicus  -scant blood on pad  Ext-trace edema    No results found for this or any previous visit (from the past 24 hour(s)). Assessment/Plan:  34yo PPD2 s/p TSVD, uncomplicated. Overall doing well.      Routine postpartum care:  -general diet  -voiding without difficulty  -ice packs, peripads to perineum prn  -cont to monitor bleeding  -ibuprofen/oxycodone for pain control  -stool softeners prn    Postpartum plans:  -breastfeeding   -male, s/p circ    Dispo: anticipate discharge home [id number] Roman MD  [facility] [place]

## 2020-11-19 NOTE — ROUTINE PROCESS
1950: Bedside shift change report given to HAROON Alexandre (oncoming nurse) by Kennedi Francois. Danilo Herrera, DIANA (offgoing nurse). Report included the following information SBAR.

## 2020-11-19 NOTE — LACTATION NOTE
This note was copied from a baby's chart. Infant weight loss - 3.4%. Discussed with parents: positioning and alternating positions, using pillows to support nursing couplet, characteristics deep v shallow latch, and feeding cues. Demonstrated breast massage and hand expression with drops of colostrum easily expressed. Assisted parents to latch infant in sidelying position with feet to shoulders. Baby nursing well and has improved throughout post partum stay, deep latch maintained, mother is comfortable, milk is in transition, baby feeding vigorously with rhythmic suck, swallow, breathe pattern, with audible swallowing, and evident milk transfer, both breasts offerd, baby is asleep following feeding. Baby is feeding on demand, voiding and stools present as appropriate over the last 24 hours. Breasts may become engorged when milk \"comes in\". How milk is made / normal phases of milk production, supply and demand discussed. Taught care of engorged breasts - frequent breastfeeding encouraged, warm compresses and breast massage ac. Then nurse the baby or pump. Apply cold compresses pc x 15 minutes a few times a day for swelling or discomfort. May need to do this care for a couple of days. Discussed prevention and treatment of mastitis. All lactation teaching completed and questions answered. Parents verbalizing confidence with infant care and feeding that has progressed throughout stay.

## 2020-11-19 NOTE — DISCHARGE SUMMARY
Obstetrical Discharge Summary     Name: Ayaz Carballo MRN: [id number]  SSN: [id number]    YOB: 1990  Age: 30 y.o. Sex: female      Admit Date: 2020    Discharge Date: 2020     Admitting Physician: Aida Triana MD     Attending Physician:  Dominguez Harry MD     Admission Diagnoses: Pregnant and not yet delivered [Z34.90]; Transverse lie of fetus [O32.2XX0]; Pregnant [Z34.90]    Discharge Diagnoses:   Information for the patient's :  [facility] [[id number]]   Delivery of a 9 lb 8.6 oz (4.325 kg) male infant via Vaginal, Spontaneous on 2020 at 4:53 PM  by Aida Triana. Apgars were 9  and 9 . Additional Diagnoses:   Hospital Problems  Date Reviewed: 2020          Codes Class Noted POA    Transverse lie of fetus ICD-10-CM: O32. 4GL5  ICD-9-CM: 652.30  2020 Unknown        Pregnant and not yet delivered ICD-10-CM: Z34.90  ICD-9-CM: V22.1  2020 Unknown        Pregnant ICD-10-CM: Z34.90  ICD-9-CM: V22.2  2020 Unknown             Lab Results   Component Value Date/Time    Rubella, External Immune 2020    GrBStrep, External negative 10/23/2020       Hospital Course: IOL for unstable fetal lie. TSVD LBMI weighing 9lb9oz. PPH ~1140cc, resolved with cytotec, pitocin, and TXA. Normal hospital course following the delivery. Stable for discharge home PPD2. Patient Instructions:   Current Discharge Medication List      CONTINUE these medications which have NOT CHANGED    Details   prenatal vit-iron fumarate-fa (Prenatal Tablet) 28 mg iron- 800 mcg tab Take 1 Tab by mouth daily. Qty: 30 Tab, Refills: 11             Reference my discharge instructions.     Follow-up Appointments   Procedures    FOLLOW UP VISIT Appointment in: Peyton Blackwood     Standing Status:   Standing     Number of Occurrences:   1     Order Specific Question:   Appointment in     Answer:   6 Weeks        Signed By:  Aida Triana MD November 19, 2020

## 2021-09-07 ENCOUNTER — HOSPITAL ENCOUNTER (OUTPATIENT)
Dept: GENERAL RADIOLOGY | Age: 31
Discharge: HOME | End: 2021-09-07
Payer: MEDICAID

## 2021-09-07 ENCOUNTER — VIRTUAL VISIT (OUTPATIENT)
Dept: INTERNAL MEDICINE CLINIC | Age: 31
End: 2021-09-07
Payer: MEDICAID

## 2021-09-07 DIAGNOSIS — S61.411A LACERATION OF RIGHT HAND, FOREIGN BODY PRESENCE UNSPECIFIED, INITIAL ENCOUNTER: ICD-10-CM

## 2021-09-07 DIAGNOSIS — S69.91XA HAND INJURIES, RIGHT, INITIAL ENCOUNTER: ICD-10-CM

## 2021-09-07 DIAGNOSIS — S69.91XA HAND INJURIES, RIGHT, INITIAL ENCOUNTER: Primary | ICD-10-CM

## 2021-09-07 DIAGNOSIS — Z23 ENCOUNTER FOR IMMUNIZATION: ICD-10-CM

## 2021-09-07 DIAGNOSIS — M79.5 FOREIGN BODY (FB) IN SOFT TISSUE: ICD-10-CM

## 2021-09-07 PROCEDURE — 90715 TDAP VACCINE 7 YRS/> IM: CPT | Performed by: INTERNAL MEDICINE

## 2021-09-07 PROCEDURE — 99213 OFFICE O/P EST LOW 20 MIN: CPT | Performed by: NURSE PRACTITIONER

## 2021-09-07 PROCEDURE — 73130 X-RAY EXAM OF HAND: CPT

## 2021-09-07 RX ORDER — CEPHALEXIN 500 MG/1
500 CAPSULE ORAL 2 TIMES DAILY
Qty: 14 CAPSULE | Refills: 0 | Status: SHIPPED | OUTPATIENT
Start: 2021-09-07 | End: 2021-09-14
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
## 2021-09-07 NOTE — PATIENT INSTRUCTIONS
Please watch for signs of infection, to include fever, increased redness, pain, or swelling. Wound Care: After Your Visit  Your Care Instructions  Taking good care of your wound at home will help it heal quickly and reduce your chance of infection. The doctor has checked you carefully, but problems can develop later. If you notice any problems or new symptoms, get medical treatment right away. Follow-up care is a key part of your treatment and safety. Be sure to make and go to all appointments, and call your doctor if you are having problems. It's also a good idea to know your test results and keep a list of the medicines you take. How can you care for yourself at home? · Clean the area with soap and water 2 times a day unless your doctor gives you different instructions. Don't use hydrogen peroxide or alcohol, which can slow healing. ¨ You may cover the wound with a thin layer of antibiotic ointment, such as bacitracin, and a nonstick bandage. ¨ Apply more ointment and replace the bandage as needed. · Take pain medicines exactly as directed. Some pain is normal with a wound, but do not ignore pain that is getting worse instead of better. You could have an infection. ¨ If the doctor gave you a prescription medicine for pain, take it as prescribed. ¨ If you are not taking a prescription pain medicine, ask your doctor if you can take an over-the-counter medicine. · Your doctor may have closed your wound with stitches (sutures), staples, or skin glue. ¨ If you have stitches, your doctor may remove them after several days to 2 weeks. Or you may have stitches that dissolve on their own. ¨ If you have staples, your doctor may remove them after 7 to 10 days. ¨ If your wound was closed with skin glue, the glue will wear off in a few days to 2 weeks. When should you call for help?   Call your doctor now or seek immediate medical care if:  · You have signs of infection, such as:  ¨ Increased pain, swelling, warmth, or redness near the wound. ¨ Red streaks leading from the wound. ¨ Pus draining from the wound. ¨ A fever. · You bleed so much from your incision that you soak one or more bandages over 2 to 4 hours. Watch closely for changes in your health, and be sure to contact your doctor if:  · The wound is not getting better each day. Where can you learn more? Go to [employer]  Enter M973 in the search box to learn more about \"Wound Care: After Your Visit. \"   © 9489-0431 Healthwise, Incorporated. Care instructions adapted under license by [facility] (which disclaims liability or warranty for this information). This care instruction is for use with your licensed healthcare professional. If you have questions about a medical condition or this instruction, always ask your healthcare professional. [address] any warranty or liability for your use of this information. Content Version: 40.6.742329; Last Revised: April 23, 2012              Vaccine Information Statement    Tdap (Tetanus, Diphtheria, Pertussis) Vaccine: What you need to know     Many Vaccine Information Statements are available in Persian and other languages. See www.immunize.org/vis  Hojas de información sobre vacunas están disponibles en español y en muchos otros idiomas. Visite www.immunize.org/vis    1. Why get vaccinated? Tdap vaccine can prevent tetanus, diphtheria, and pertussis. Diphtheria and pertussis spread from person to person. Tetanus enters the body through cuts or wounds.  TETANUS (T) causes painful stiffening of the muscles. Tetanus can lead to serious health problems, including being unable to open the mouth, having trouble swallowing and breathing, or death.  DIPHTHERIA (D) can lead to difficulty breathing, heart failure, paralysis, or death.     PERTUSSIS (aP), also known as whooping cough, can cause uncontrollable, violent coughing which makes it hard to breathe, eat, or drink. Pertussis can be extremely serious in babies and young children, causing pneumonia, convulsions, brain damage, or death. In teens and adults, it can cause weight loss, loss of bladder control, passing out, and rib fractures from severe coughing. 2. Tdap vaccine     Tdap is only for children 7 years and older, adolescents, and adults. Adolescents should receive a single dose of Tdap, preferably at age 6 or 15 years. Pregnant women should get a dose of Tdap during every pregnancy, to protect the  from pertussis. Infants are most at risk for severe, life-threatening complications from pertussis. Adults who have never received Tdap should get a dose of Tdap. Also, adults should receive a booster dose every 10 years, or earlier in the case of a severe and dirty wound or burn. Booster doses can be either Tdap or Td (a different vaccine that protects against tetanus and diphtheria but not pertussis). Tdap may be given at the same time as other vaccines. 3. Talk with your health care provider    Tell your vaccine provider if the person getting the vaccine:   Has had an allergic reaction after a previous dose of any vaccine that protects against tetanus, diphtheria, or pertussis, or has any severe, life-threatening allergies.  Has had a coma, decreased level of consciousness, or prolonged seizures within 7 days after a previous dose of any pertussis vaccine (DTP, DTaP, or Tdap).  Has seizures or another nervous system problem.  Has ever had Guillain-Barré Syndrome (also called GBS).  Has had severe pain or swelling after a previous dose of any vaccine that protects against tetanus or diphtheria. In some cases, your health care provider may decide to postpone Tdap vaccination to a future visit. People with minor illnesses, such as a cold, may be vaccinated.  People who are moderately or severely ill should usually wait until they recover before getting Tdap vaccine. Your health care provider can give you more information. 4. Risks of a vaccine reaction     Pain, redness, or swelling where the shot was given, mild fever, headache, feeling tired, and nausea, vomiting, diarrhea, or stomachache sometimes happen after Tdap vaccine. People sometimes faint after medical procedures, including vaccination. Tell your provider if you feel dizzy or have vision changes or ringing in the ears. As with any medicine, there is a very remote chance of a vaccine causing a severe allergic reaction, other serious injury, or death. 5. What if there is a serious problem? An allergic reaction could occur after the vaccinated person leaves the clinic. If you see signs of a severe allergic reaction (hives, swelling of the face and throat, difficulty breathing, a fast heartbeat, dizziness, or weakness), call 9-1-1 and get the person to the nearest hospital.    For other signs that concern you, call your health care provider. Adverse reactions should be reported to the Vaccine Adverse Event Reporting System (VAERS). Your health care provider will usually file this report, or you can do it yourself. Visit the VAERS website at www.vaers. hhs.gov or call 4-667.244.6384. VAERS is only for reporting reactions, and VAERS staff do not give medical advice. 6. The National Vaccine Injury Compensation Program    The [facility] Vaccine Injury Compensation Program (VICP) is a federal program that was created to compensate people who may have been injured by certain vaccines. Visit the VICP website at www.hrsa.gov/vaccinecompensation or call 9-775.816.6336 to learn about the program and about filing a claim. There is a time limit to file a claim for compensation. 7. How can I learn more?  Ask your health care provider.  Call your local or state health department.    Contact the Centers for Disease Control and Prevention (CDC):  - Call 5-587.374.4995 (1-800-CDC-INFO) or  - Visit CDCs website at www.cdc.gov/vaccines    Vaccine Information Statement (Interim)  Tdap (Tetanus, Diphtheria, Pertussis) Vaccine  04/01/2020  [id number]   Department of Health and Human Services  Centers for Disease Control and Prevention    Office Use Only

## 2021-09-07 NOTE — PROGRESS NOTES
Donna Rooney is a 35 y.o. female who presents for routine immunizations. She denies any symptoms , reactions or allergies that would exclude them from being immunized today. Risks and adverse reactions were discussed and the VIS was given to them. All questions were addressed. She was observed for 15 min post injection. There were no reactions observed. Shania Tran LPN

## 2021-09-07 NOTE — PROGRESS NOTES
Pt is here for   Chief Complaint   Patient presents with    Finger Swelling     index finger DOXY. -[phone]    Arm laceration     states right arm, pt states she was suppose to get stitches but she didn't      States 10/10 pain right arm and index finger      1. Have you been to the ER, urgent care clinic since your last visit? Hospitalized since your last visit? No    2. Have you seen or consulted any other health care providers outside of the [address] [place] [address] since your last visit? Include any pap smears or colon screening.  No

## 2022-03-18 PROBLEM — Z97.5 NORPLANT IN PLACE: Status: ACTIVE | Noted: 2017-10-25

## 2022-03-18 PROBLEM — O32.2XX0 TRANSVERSE LIE OF FETUS: Status: ACTIVE | Noted: 2020-11-16

## 2022-03-18 PROBLEM — Z34.03 ENCOUNTER FOR SUPERVISION OF NORMAL FIRST PREGNANCY IN THIRD TRIMESTER: Status: ACTIVE | Noted: 2020-08-27

## 2022-03-18 PROBLEM — Z34.90 PREGNANT: Status: ACTIVE | Noted: 2020-11-16

## 2022-03-19 PROBLEM — R51.9 FREQUENT HEADACHES: Status: ACTIVE | Noted: 2017-10-25

## 2022-03-19 PROBLEM — E66.01 SEVERE OBESITY (HCC): Status: ACTIVE | Noted: 2018-12-19

## 2022-03-19 PROBLEM — M54.50 ACUTE MIDLINE LOW BACK PAIN WITHOUT SCIATICA: Status: ACTIVE | Noted: 2017-10-25

## 2022-03-19 PROBLEM — R29.898 WEAKNESS OF BOTH LEGS: Status: ACTIVE | Noted: 2017-10-25

## 2022-03-20 PROBLEM — Z34.90 PREGNANT AND NOT YET DELIVERED: Status: ACTIVE | Noted: 2020-11-16

## 2022-07-15 ENCOUNTER — HOSPITAL ENCOUNTER (INPATIENT)
Age: 32
LOS: 1 days | Discharge: HOME | DRG: 751 | End: 2022-07-18
Attending: EMERGENCY MEDICINE | Admitting: PSYCHIATRY & NEUROLOGY
Payer: MEDICAID

## 2022-07-15 DIAGNOSIS — Z00.00 GENERAL MEDICAL EXAM: Primary | ICD-10-CM

## 2022-07-15 DIAGNOSIS — R46.89 AGGRESSIVE BEHAVIOR: ICD-10-CM

## 2022-07-15 PROCEDURE — 99285 EMERGENCY DEPT VISIT HI MDM: CPT

## 2022-07-15 NOTE — ED TRIAGE NOTES
Pt arrives under eco with [employer]. Pt arrives in handcuffs. [facility] aware of patient. Pt was picked up with [employer] due to  ordering ECO. Pt denies SI, HI. Pt A&Ox4 in triage. Pt denies auditory/visual hallucinations.
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
## 2022-07-16 LAB
ALBUMIN SERPL-MCNC: 3.7 G/DL (ref 3.5–5)
ALBUMIN/GLOB SERPL: 0.9 {RATIO} (ref 1.1–2.2)
ALP SERPL-CCNC: 69 U/L (ref 45–117)
ALT SERPL-CCNC: 22 U/L (ref 12–78)
AMPHET UR QL SCN: NEGATIVE
ANION GAP SERPL CALC-SCNC: 4 MMOL/L (ref 5–15)
APAP SERPL-MCNC: <2 UG/ML (ref 10–30)
APPEARANCE UR: CLEAR
AST SERPL-CCNC: 17 U/L (ref 15–37)
BACTERIA URNS QL MICRO: NEGATIVE /HPF
BARBITURATES UR QL SCN: NEGATIVE
BASOPHILS # BLD: 0 K/UL (ref 0–0.1)
BASOPHILS NFR BLD: 0 % (ref 0–1)
BENZODIAZ UR QL: NEGATIVE
BILIRUB SERPL-MCNC: 1.4 MG/DL (ref 0.2–1)
BILIRUB UR QL: NEGATIVE
BUN SERPL-MCNC: 7 MG/DL (ref 6–20)
BUN/CREAT SERPL: 10 (ref 12–20)
CALCIUM SERPL-MCNC: 9 MG/DL (ref 8.5–10.1)
CANNABINOIDS UR QL SCN: POSITIVE
CHLORIDE SERPL-SCNC: 107 MMOL/L (ref 97–108)
CO2 SERPL-SCNC: 28 MMOL/L (ref 21–32)
COCAINE UR QL SCN: NEGATIVE
COLOR UR: ABNORMAL
COMMENT, HOLDF: NORMAL
CREAT SERPL-MCNC: 0.69 MG/DL (ref 0.55–1.02)
DIFFERENTIAL METHOD BLD: NORMAL
DRUG SCRN COMMENT,DRGCM: ABNORMAL
EOSINOPHIL # BLD: 0.1 K/UL (ref 0–0.4)
EOSINOPHIL NFR BLD: 2 % (ref 0–7)
EPITH CASTS URNS QL MICRO: ABNORMAL /LPF
ERYTHROCYTE [DISTWIDTH] IN BLOOD BY AUTOMATED COUNT: 13.6 % (ref 11.5–14.5)
ETHANOL SERPL-MCNC: <10 MG/DL
FLUAV RNA SPEC QL NAA+PROBE: NOT DETECTED
FLUBV RNA SPEC QL NAA+PROBE: NOT DETECTED
GLOBULIN SER CALC-MCNC: 3.9 G/DL (ref 2–4)
GLUCOSE SERPL-MCNC: 92 MG/DL (ref 65–100)
GLUCOSE UR STRIP.AUTO-MCNC: NEGATIVE MG/DL
HCG UR QL: NEGATIVE
HCT VFR BLD AUTO: 37.8 % (ref 35–47)
HGB BLD-MCNC: 12.2 G/DL (ref 11.5–16)
HGB UR QL STRIP: NEGATIVE
HYALINE CASTS URNS QL MICRO: ABNORMAL /LPF (ref 0–5)
IMM GRANULOCYTES # BLD AUTO: 0 K/UL (ref 0–0.04)
IMM GRANULOCYTES NFR BLD AUTO: 0 % (ref 0–0.5)
KETONES UR QL STRIP.AUTO: 15 MG/DL
LEUKOCYTE ESTERASE UR QL STRIP.AUTO: NEGATIVE
LYMPHOCYTES # BLD: 2.4 K/UL (ref 0.8–3.5)
LYMPHOCYTES NFR BLD: 32 % (ref 12–49)
MCH RBC QN AUTO: 28.2 PG (ref 26–34)
MCHC RBC AUTO-ENTMCNC: 32.3 G/DL (ref 30–36.5)
MCV RBC AUTO: 87.3 FL (ref 80–99)
METHADONE UR QL: NEGATIVE
MONOCYTES # BLD: 0.7 K/UL (ref 0–1)
MONOCYTES NFR BLD: 10 % (ref 5–13)
NEUTS SEG # BLD: 4.1 K/UL (ref 1.8–8)
NEUTS SEG NFR BLD: 56 % (ref 32–75)
NITRITE UR QL STRIP.AUTO: NEGATIVE
NRBC # BLD: 0 K/UL (ref 0–0.01)
NRBC BLD-RTO: 0 PER 100 WBC
OPIATES UR QL: NEGATIVE
PCP UR QL: NEGATIVE
PH UR STRIP: 5.5 [PH] (ref 5–8)
PLATELET # BLD AUTO: 341 K/UL (ref 150–400)
PMV BLD AUTO: 9.6 FL (ref 8.9–12.9)
POTASSIUM SERPL-SCNC: 4 MMOL/L (ref 3.5–5.1)
PROT SERPL-MCNC: 7.6 G/DL (ref 6.4–8.2)
PROT UR STRIP-MCNC: 30 MG/DL
RBC # BLD AUTO: 4.33 M/UL (ref 3.8–5.2)
RBC #/AREA URNS HPF: ABNORMAL /HPF (ref 0–5)
SALICYLATES SERPL-MCNC: <1.7 MG/DL (ref 2.8–20)
SAMPLES BEING HELD,HOLD: NORMAL
SARS-COV-2, COV2: NOT DETECTED
SODIUM SERPL-SCNC: 139 MMOL/L (ref 136–145)
UR CULT HOLD, URHOLD: NORMAL
UROBILINOGEN UR QL STRIP.AUTO: 0.2 EU/DL (ref 0.2–1)
WBC # BLD AUTO: 7.4 K/UL (ref 3.6–11)
WBC URNS QL MICRO: ABNORMAL /HPF (ref 0–4)

## 2022-07-16 PROCEDURE — 80179 DRUG ASSAY SALICYLATE: CPT

## 2022-07-16 PROCEDURE — 81025 URINE PREGNANCY TEST: CPT

## 2022-07-16 PROCEDURE — 80053 COMPREHEN METABOLIC PANEL: CPT

## 2022-07-16 PROCEDURE — 82077 ASSAY SPEC XCP UR&BREATH IA: CPT

## 2022-07-16 PROCEDURE — 80143 DRUG ASSAY ACETAMINOPHEN: CPT

## 2022-07-16 PROCEDURE — 80307 DRUG TEST PRSMV CHEM ANLYZR: CPT

## 2022-07-16 PROCEDURE — 81001 URINALYSIS AUTO W/SCOPE: CPT

## 2022-07-16 PROCEDURE — 85025 COMPLETE CBC W/AUTO DIFF WBC: CPT

## 2022-07-16 PROCEDURE — 87636 SARSCOV2 & INF A&B AMP PRB: CPT

## 2022-07-16 PROCEDURE — 36415 COLL VENOUS BLD VENIPUNCTURE: CPT
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
## 2022-07-16 NOTE — BSMART NOTE
This writer rounded on patient. The patient's appearance is bizarre. The patient's behavior shows retardation. The patient is oriented to time, place, person and situation. The patient's speech is impoverished. The patient's mood  is depressed. The range of affect is flat. The patient's thought content  demonstrates paranoia. The thought process shows no evidence of impairment. The patient's perception shows no evidence of impairment. The patient's memory shows no evidence of impairment. The patient's appetite is decreased and shows signs of weight loss. The patient's sleep has evidence of hypersomnia. The patient's insight is blaming. The patient's judgement is psychologically impaired. Patient denies suicidal and/or homicidal ideation. The plan is for Dilshad PATTON to continue searching for bed as patient is currently under an ECO. Patient has had very little to drink or eat since arrival. She is quite fixated on how things could be contaminated unless they are sealed. Even with sealed items, there is hesitation. When questioned further, she indicated she didn't want to eat or drink too much because she didn't want to have to use the bathroom citing \"bacteria and germs\". In addition, she stated she didn't want beef or pork products saying \"some religions don't eat those so I shouldn't either\". With dinner tray she refused to eat it saying \"It has cheese-I don't want milk-that comes from inside the cow where it sits\". She has slept much of the day and is mostly quiet when awake. She continues to have  Scarf over her head and face even when spoken to. At times she is irritable with staff, particularly when questioned about how she is doing. She continues to state her desire to go home. She has not had any aggressive behavior since arrival and has not required any PRN medications. Murray POOJA has copies of her lab and chart.  They continue to be searching for placement, but due to her level of aggression yesterday, she has been denied at most places. Psych NP met with patient this morning.      Osmany Prado [facility]
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
## 2022-07-17 RX ORDER — HALOPERIDOL 5 MG/ML
5 INJECTION INTRAMUSCULAR
Status: DISCONTINUED | OUTPATIENT
Start: 2022-07-17 | End: 2022-07-18 | Stop reason: HOSPADM

## 2022-07-17 RX ORDER — LORAZEPAM 0.5 MG/1
1 TABLET ORAL
Status: DISCONTINUED | OUTPATIENT
Start: 2022-07-17 | End: 2022-07-18 | Stop reason: HOSPADM

## 2022-07-17 NOTE — ED NOTES
11:33 PM  Change of shift. Care of patient taken over from Dr. Chris Zavala; H&P reviewed, bedside handoff complete. Awaiting bed placement at this time. Vital signs reviewed and the patient is asleep and resting comfortably. She denies any discomfort at this time. Will continue to monitor and await bed placement. 7:29 AM  Change of shift. Care of patient signed over to Dr. Jena Mojica. Bedside handoff complete. Awaiting bed placement. Patient remained hemodynamically stable.
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
## 2022-07-17 NOTE — BSMART NOTE
This writer rounded on patient. Patient is asleep and unable to be roused. Heike MORGANB are at bedside.

## 2022-07-17 NOTE — BSMART NOTE
Attempted to round on pt, but was unsuccessful at this time d/t pt currently being in the bathroom. Spoke w/ Officer Katy Whitt who is standing outside of the bathroom at this time. Officer Katy Whitt expressed concerns that pt is currently on her menstrual cycle and may require nursing assistance in regards to hygiene. Notified Rian Nelson who agreed to relay information to the Charge RN.

## 2022-07-17 NOTE — ED NOTES
Pt continues to be on stretcher in [place] with [employer] at bedside. Pt requested salad for lunch. She took a few bites and stated \"it's not what I wanted\" Khadra Teixeira, Psych, at bedside to assess patient.  Pt cooperative

## 2022-07-18 VITALS
OXYGEN SATURATION: 96 % | SYSTOLIC BLOOD PRESSURE: 121 MMHG | HEIGHT: 63 IN | DIASTOLIC BLOOD PRESSURE: 76 MMHG | BODY MASS INDEX: 40.21 KG/M2 | RESPIRATION RATE: 16 BRPM | TEMPERATURE: 98.3 F | HEART RATE: 74 BPM

## 2022-07-18 PROBLEM — F29 PSYCHOSIS (HCC): Status: ACTIVE | Noted: 2022-07-18

## 2022-07-18 PROCEDURE — 65270000029 HC RM PRIVATE

## 2022-07-18 NOTE — BSMART NOTE
Attempted to round on pt, but was unsuccessful at this time d/t pt currently being in the bathroom w/ officer present outside. Spoke w/ Officer, Abi Marroquin who explains pt spoke w/ an  via tele earlier today in preparation for her commitment hearing.
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
## 2022-07-18 NOTE — BSMART NOTE
This writer rounded on patient. Patient agreed to talk with this writer and was informed of counselor's role. The patient's appearance with scarf tied around head and neck. The patient's behavior shows no evidence of impairment and calm. The patient is only aware of  place, person and situation and she was oriented to the time of day. The patient's speech shows no evidence of impairment. The patient's mood  calm. The range of affect is flat. The patient's thought content  demonstrates no evidence of impairment. The thought process shows no evidence of impairment. The patient's perception shows no evidence of impairment. The patient's memory shows no evidence of impairment. The patient's appetite shows no evidence of impairment. The patient's sleep shows no evidence of impairment. The patient's insight shows no evidence of impairment. The patient's judgement shows no evidence of impairment. Patient denied suicidal and/or homicidal ideation. The plan is [facility] staff to continue to round on patient while in the ER as [facility] continues to conduct bedsearches. Patient asked about her breakfast tray and was receptive to the approximate time it would be coming. She declined needing to ask nursing staff for a snack at this time. Patient did inquire about how much longer before she would get a bed. She noted that she just really wanted to go home so that she could get her two children but stated she understood she could not go home right now. Patient denied any concerns at this time and agreed to let staff know if she did have any concerns. She asked [facility] counselor if there were any more questions for her.

## 2022-07-18 NOTE — ED NOTES
10:30 PM  Change of shift. Care of patient taken over from Dr. Brandon Hope; H&P reviewed, bedside handoff complete. Awaiting bed placement. Vital signs reviewed. The patient has no further complaint at this time. 8:30 AM  Change of shift. Care of patient signed over to Dr. Maia Fong. Bedside handoff complete. Awaiting bed placement.

## 2022-07-18 NOTE — ED NOTES
Pt is on her menses. While in the rest room, pt with large amount of menses and clots. Dr Dejah Dozier notified.

## 2022-07-18 NOTE — ED NOTES
Pt released from TDO order. Pt medically cleared and discharged by ED provider. Required paperwork received and scanned into chart. Pt belongings returned to pt.

## 2022-07-18 NOTE — BSMART NOTE
Attempted to round on pt, but was unsuccessful at this time d/t pt currently participating in her commitment hearing via tele. Officer Meghna Castrejon is present at bedside.

## 2022-07-18 NOTE — ED NOTES
Assumed care of patient at this time. Pt calm and cooperative. Denies SI/HI. [employer] officers at bedside. Sitter at bedside. Pt refuses to changed into behavioral health green gown. Pt asking to use restroom and sitter ambulated pt to restroom.

## 2022-07-18 NOTE — ED NOTES
6:03 PM  Patient had a hearing with the . She is no longer under a TDO. Patient would like to be discharged. Patient discharged. Patient's results have been reviewed with them. Patient and/or family have verbally conveyed their understanding and agreement of the patient's signs, symptoms, diagnosis, treatment and prognosis and additionally agree to follow up as recommended or return to the Emergency Room should their condition change prior to follow-up. Discharge instructions have also been provided to the patient with some educational information regarding their diagnosis as well a list of reasons why they would want to return to the ER prior to their follow-up appointment should their condition change.

## 2022-07-18 NOTE — ED NOTES
Turned over to me by Dr. Adriana Walton at change of shift. Patient has had no difficulty during the shift other than having a large menstrual bleed. Her pregnancy test 3 days ago was negative. She has been medically stable for the shift and is required no intervention.

## 2022-07-20 ENCOUNTER — APPOINTMENT (OUTPATIENT)
Dept: GENERAL RADIOLOGY | Age: 32
End: 2022-07-20
Attending: EMERGENCY MEDICINE
Payer: MEDICAID

## 2022-07-20 ENCOUNTER — HOSPITAL ENCOUNTER (EMERGENCY)
Age: 32
Discharge: HOME | End: 2022-07-20
Attending: EMERGENCY MEDICINE
Payer: MEDICAID

## 2022-07-20 VITALS
HEART RATE: 100 BPM | WEIGHT: 227 LBS | DIASTOLIC BLOOD PRESSURE: 76 MMHG | HEIGHT: 63 IN | OXYGEN SATURATION: 98 % | SYSTOLIC BLOOD PRESSURE: 121 MMHG | TEMPERATURE: 98 F | BODY MASS INDEX: 40.22 KG/M2 | RESPIRATION RATE: 18 BRPM

## 2022-07-20 DIAGNOSIS — T14.8XXA MUSCLE STRAIN: ICD-10-CM
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
## 2022-07-20 PROCEDURE — 99283 EMERGENCY DEPT VISIT LOW MDM: CPT

## 2022-07-20 PROCEDURE — 70360 X-RAY EXAM OF NECK: CPT

## 2022-07-20 PROCEDURE — 71045 X-RAY EXAM CHEST 1 VIEW: CPT

## 2022-07-20 RX ORDER — ACETAMINOPHEN 325 MG/1
650 TABLET ORAL
Qty: 20 TABLET | Refills: 0 | Status: SHIPPED | OUTPATIENT
Start: 2022-07-20

## 2022-07-20 RX ORDER — METHOCARBAMOL 500 MG/1
500 TABLET, FILM COATED ORAL ONCE
Status: DISCONTINUED | OUTPATIENT
Start: 2022-07-20 | End: 2022-07-20

## 2022-07-20 RX ORDER — METHOCARBAMOL 500 MG/1
500 TABLET, FILM COATED ORAL 3 TIMES DAILY
Qty: 15 TABLET | Refills: 0 | Status: SHIPPED | OUTPATIENT
Start: 2022-07-20

## 2022-07-20 RX ORDER — KETOROLAC TROMETHAMINE 30 MG/ML
30 INJECTION, SOLUTION INTRAMUSCULAR; INTRAVENOUS
Status: DISCONTINUED | OUTPATIENT
Start: 2022-07-20 | End: 2022-07-20

## 2022-07-20 RX ORDER — LIDOCAINE 4 G/100G
1 PATCH TOPICAL ONCE
Status: DISCONTINUED | OUTPATIENT
Start: 2022-07-20 | End: 2022-07-20 | Stop reason: HOSPADM

## 2022-07-20 NOTE — ED NOTES
Emergency Department Nursing Plan of Care       The Nursing Plan of Care is developed from the Nursing assessment and Emergency Department Attending provider initial evaluation. The plan of care may be reviewed in the ED Provider note.     The Plan of Care was developed with the following considerations:   Patient / Family readiness to learn indicated by:verbalized understanding  Persons(s) to be included in education: patient  Barriers to Learning/Limitations:No    Signed     Brian Fried RN    7/20/2022   4:25 AM

## 2022-07-20 NOTE — DISCHARGE INSTRUCTIONS
Thank You! It was a pleasure taking care of you in our Emergency Department today. We know that when you come to [address] [place] [address], you are entrusting us with your health, comfort, and safety. Our physicians and nurses honor that trust, and truly appreciate the opportunity to care for you and your loved ones. We also value your feedback. If you receive a survey about your Emergency Department experience today, please fill it out. We care about our patients' feedback, and we listen to what you have to say. Thank you. Dr. Taran Jordan M.D.      ____________________________________________________________________  I have included a copy of your lab results and/or radiologic studies from today's visit so you can have them easily available at your follow-up visit. We hope you feel better and please do not hesitate to contact the ED if you have any questions at all! @RESULTRCNT(12h)@    [unfilled]  CT Results  (Last 48 hours)      None          The exam and treatment you received in the Emergency Department were for an urgent problem and are not intended as complete care. It is important that you follow up with a doctor, nurse practitioner, or physician assistant for ongoing care. If your symptoms become worse or you do not improve as expected and you are unable to reach your usual health care provider, you should return to the Emergency Department. We are available 24 hours a day. Please take your discharge instructions with you when you go to your follow-up appointment. If a prescription has been provided, please have it filled as soon as possible to prevent a delay in treatment. Read the entire medication instruction sheet provided to you by the pharmacy. If you have any questions or reservations about taking the medication due to side effects or interactions with other medications, please call your primary care physician or contact the ER to speak with the charge nurse. Please make an appointment with your family doctor or the physician you were referred to for follow-up of this visit as instructed on your discharge paperwork. Return to the ER if you are unable to be seen or if you are unable to be seen in a timely manner. If you have any problem arranging the follow-up visit, contact the Emergency Department immediately.
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
## 2023-10-11 ENCOUNTER — NURSE TRIAGE (OUTPATIENT)
Dept: OTHER | Facility: CLINIC | Age: 33
End: 2023-10-11

## 2023-10-11 ENCOUNTER — TELEMEDICINE (OUTPATIENT)
Facility: CLINIC | Age: 33
End: 2023-10-11
Payer: MEDICAID

## 2023-10-11 DIAGNOSIS — H57.9 EYE PROBLEM: Primary | ICD-10-CM

## 2023-10-11 PROCEDURE — 99213 OFFICE O/P EST LOW 20 MIN: CPT | Performed by: NURSE PRACTITIONER
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
